# Patient Record
Sex: MALE | Race: WHITE | NOT HISPANIC OR LATINO | Employment: OTHER | URBAN - NONMETROPOLITAN AREA
[De-identification: names, ages, dates, MRNs, and addresses within clinical notes are randomized per-mention and may not be internally consistent; named-entity substitution may affect disease eponyms.]

---

## 2023-03-07 ENCOUNTER — TELEPHONE (OUTPATIENT)
Dept: PRIMARY CARE | Facility: CLINIC | Age: 68
End: 2023-03-07
Payer: MEDICARE

## 2023-03-07 DIAGNOSIS — J44.9 CHRONIC OBSTRUCTIVE PULMONARY DISEASE, UNSPECIFIED COPD TYPE (MULTI): Primary | ICD-10-CM

## 2023-03-07 NOTE — TELEPHONE ENCOUNTER
Call from wife stating patient was selected for jury duty and is asking for a medical excuse. Asking for a call back.

## 2023-03-10 RX ORDER — TIOTROPIUM BROMIDE 18 UG/1
1 CAPSULE ORAL; RESPIRATORY (INHALATION)
Qty: 90 CAPSULE | Refills: 3 | Status: SHIPPED | OUTPATIENT
Start: 2023-03-10 | End: 2023-05-23 | Stop reason: SDUPTHER

## 2023-03-10 RX ORDER — TIOTROPIUM BROMIDE 18 UG/1
1 CAPSULE ORAL; RESPIRATORY (INHALATION)
COMMUNITY
Start: 2020-03-12 | End: 2023-03-10 | Stop reason: SDUPTHER

## 2023-03-10 RX ORDER — FLUTICASONE PROPIONATE AND SALMETEROL XINAFOATE 115; 21 UG/1; UG/1
2 AEROSOL, METERED RESPIRATORY (INHALATION)
Qty: 12 G | Refills: 3 | Status: SHIPPED | OUTPATIENT
Start: 2023-03-10 | End: 2023-05-18 | Stop reason: SDUPTHER

## 2023-03-10 RX ORDER — FLUTICASONE PROPIONATE AND SALMETEROL XINAFOATE 115; 21 UG/1; UG/1
2 AEROSOL, METERED RESPIRATORY (INHALATION)
COMMUNITY
Start: 2020-03-12 | End: 2023-03-10 | Stop reason: SDUPTHER

## 2023-03-10 NOTE — TELEPHONE ENCOUNTER
Rep from [facility] scripts [phone] Ref# [id number] needs new issue date for Prescription  Advair HFA with counter 12g 115/21 & Spiriva handhaler capsules 18 mg packsa of 30qty. Please call to discuss

## 2023-03-15 PROBLEM — R06.02 SHORTNESS OF BREATH ON EXERTION: Status: ACTIVE | Noted: 2023-03-15

## 2023-03-15 PROBLEM — M81.0 OSTEOPOROSIS: Status: ACTIVE | Noted: 2023-03-15

## 2023-03-15 PROBLEM — C44.91 BASAL CELL CARCINOMA (BCC): Status: ACTIVE | Noted: 2023-03-15

## 2023-03-15 PROBLEM — R60.0 BILATERAL LEG EDEMA: Status: ACTIVE | Noted: 2023-03-15

## 2023-03-15 PROBLEM — I10 HYPERTENSION: Status: ACTIVE | Noted: 2023-03-15

## 2023-03-15 PROBLEM — L25.9 CONTACT DERMATITIS: Status: ACTIVE | Noted: 2023-03-15

## 2023-03-15 PROBLEM — H26.9 CATARACT: Status: ACTIVE | Noted: 2023-03-15

## 2023-03-15 PROBLEM — R09.02 HYPOXEMIA: Status: ACTIVE | Noted: 2023-03-15

## 2023-03-15 PROBLEM — E66.9 OBESITY (BMI 30-39.9): Status: ACTIVE | Noted: 2023-03-15

## 2023-03-15 PROBLEM — K21.9 GERD (GASTROESOPHAGEAL REFLUX DISEASE): Status: ACTIVE | Noted: 2023-03-15

## 2023-03-15 PROBLEM — J43.9 EMPHYSEMA LUNG (MULTI): Status: ACTIVE | Noted: 2023-03-15

## 2023-03-15 PROBLEM — G47.33 OBSTRUCTIVE SLEEP APNEA, ADULT: Status: ACTIVE | Noted: 2023-03-15

## 2023-03-15 PROBLEM — S22.009A: Status: ACTIVE | Noted: 2023-03-15

## 2023-03-15 PROBLEM — J44.9 COPD (CHRONIC OBSTRUCTIVE PULMONARY DISEASE) (MULTI): Status: ACTIVE | Noted: 2023-03-15

## 2023-03-15 PROBLEM — M54.50 LOW BACK PAIN: Status: ACTIVE | Noted: 2023-03-15

## 2023-03-15 RX ORDER — FUROSEMIDE 20 MG/1
0.5 TABLET ORAL DAILY
COMMUNITY
End: 2023-03-23 | Stop reason: SDUPTHER

## 2023-03-15 RX ORDER — MULTIVITAMIN
TABLET ORAL
COMMUNITY

## 2023-03-15 RX ORDER — ZAFIRLUKAST 20 MG/1
1 TABLET, FILM COATED ORAL 2 TIMES DAILY
COMMUNITY
Start: 2020-03-12 | End: 2023-08-10 | Stop reason: SDUPTHER

## 2023-03-15 RX ORDER — CHOLECALCIFEROL (VITAMIN D3) 25 MCG
TABLET ORAL
COMMUNITY

## 2023-03-15 RX ORDER — CYCLOBENZAPRINE HCL 10 MG
1-2 TABLET ORAL DAILY PRN
COMMUNITY
Start: 2022-06-28 | End: 2023-03-23 | Stop reason: SDUPTHER

## 2023-03-15 RX ORDER — ENOXAPARIN SODIUM 100 MG/ML
INJECTION SUBCUTANEOUS
COMMUNITY
End: 2023-03-23

## 2023-03-15 RX ORDER — OMEPRAZOLE 20 MG/1
1 CAPSULE, DELAYED RELEASE ORAL DAILY
COMMUNITY
End: 2023-03-23 | Stop reason: SDUPTHER

## 2023-03-15 RX ORDER — DILTIAZEM HYDROCHLORIDE 180 MG/1
1 CAPSULE, EXTENDED RELEASE ORAL DAILY
COMMUNITY
End: 2023-03-23 | Stop reason: SDUPTHER

## 2023-03-15 RX ORDER — ALBUTEROL SULFATE 0.83 MG/ML
2.5 SOLUTION RESPIRATORY (INHALATION) EVERY 6 HOURS PRN
COMMUNITY
End: 2023-08-24 | Stop reason: SDUPTHER

## 2023-03-23 ENCOUNTER — OFFICE VISIT (OUTPATIENT)
Dept: PRIMARY CARE | Facility: CLINIC | Age: 68
End: 2023-03-23
Payer: MEDICARE

## 2023-03-23 VITALS
WEIGHT: 214 LBS | HEART RATE: 90 BPM | OXYGEN SATURATION: 92 % | BODY MASS INDEX: 29.96 KG/M2 | HEIGHT: 71 IN | DIASTOLIC BLOOD PRESSURE: 78 MMHG | SYSTOLIC BLOOD PRESSURE: 130 MMHG

## 2023-03-23 DIAGNOSIS — I10 HYPERTENSION, UNSPECIFIED TYPE: ICD-10-CM

## 2023-03-23 DIAGNOSIS — K21.9 GASTROESOPHAGEAL REFLUX DISEASE WITHOUT ESOPHAGITIS: ICD-10-CM

## 2023-03-23 DIAGNOSIS — J44.9 CHRONIC OBSTRUCTIVE PULMONARY DISEASE, UNSPECIFIED COPD TYPE (MULTI): Primary | ICD-10-CM

## 2023-03-23 DIAGNOSIS — M54.50 CHRONIC LOW BACK PAIN WITHOUT SCIATICA, UNSPECIFIED BACK PAIN LATERALITY: ICD-10-CM

## 2023-03-23 DIAGNOSIS — F41.9 ANXIETY: ICD-10-CM

## 2023-03-23 DIAGNOSIS — R60.0 BILATERAL LEG EDEMA: ICD-10-CM

## 2023-03-23 DIAGNOSIS — G89.29 CHRONIC LOW BACK PAIN WITHOUT SCIATICA, UNSPECIFIED BACK PAIN LATERALITY: ICD-10-CM

## 2023-03-23 PROCEDURE — 3078F DIAST BP <80 MM HG: CPT

## 2023-03-23 PROCEDURE — 1159F MED LIST DOCD IN RCRD: CPT

## 2023-03-23 PROCEDURE — 99214 OFFICE O/P EST MOD 30 MIN: CPT

## 2023-03-23 PROCEDURE — 4004F PT TOBACCO SCREEN RCVD TLK: CPT

## 2023-03-23 PROCEDURE — 3075F SYST BP GE 130 - 139MM HG: CPT

## 2023-03-23 RX ORDER — MELOXICAM 15 MG/1
15 TABLET ORAL DAILY
Qty: 30 TABLET | Refills: 0 | Status: SHIPPED | OUTPATIENT
Start: 2023-03-23 | End: 2023-05-23 | Stop reason: SDUPTHER

## 2023-03-23 RX ORDER — DILTIAZEM HYDROCHLORIDE 180 MG/1
180 CAPSULE, EXTENDED RELEASE ORAL DAILY
Qty: 90 CAPSULE | Refills: 3 | Status: SHIPPED | OUTPATIENT
Start: 2023-03-23 | End: 2024-01-18 | Stop reason: SDUPTHER

## 2023-03-23 RX ORDER — CYCLOBENZAPRINE HCL 10 MG
10 TABLET ORAL 2 TIMES DAILY PRN
Qty: 60 TABLET | Refills: 2 | Status: SHIPPED | OUTPATIENT
Start: 2023-03-23 | End: 2023-05-23 | Stop reason: SDUPTHER

## 2023-03-23 RX ORDER — OMEPRAZOLE 20 MG/1
20 CAPSULE, DELAYED RELEASE ORAL DAILY
Qty: 90 CAPSULE | Refills: 3 | Status: SHIPPED | OUTPATIENT
Start: 2023-03-23 | End: 2024-01-18 | Stop reason: SDUPTHER

## 2023-03-23 RX ORDER — FUROSEMIDE 20 MG/1
20 TABLET ORAL DAILY
Qty: 30 TABLET | Refills: 0 | Status: SHIPPED | OUTPATIENT
Start: 2023-03-23 | End: 2023-05-23 | Stop reason: SDUPTHER

## 2023-03-23 RX ORDER — VENLAFAXINE HYDROCHLORIDE 37.5 MG/1
37.5 CAPSULE, EXTENDED RELEASE ORAL DAILY
Qty: 30 CAPSULE | Refills: 1 | Status: SHIPPED | OUTPATIENT
Start: 2023-03-23 | End: 2023-05-23 | Stop reason: SDUPTHER

## 2023-03-23 ASSESSMENT — PATIENT HEALTH QUESTIONNAIRE - PHQ9
4. FEELING TIRED OR HAVING LITTLE ENERGY: SEVERAL DAYS
SUM OF ALL RESPONSES TO PHQ9 QUESTIONS 1 AND 2: 0
7. TROUBLE CONCENTRATING ON THINGS, SUCH AS READING THE NEWSPAPER OR WATCHING TELEVISION: NOT AT ALL
1. LITTLE INTEREST OR PLEASURE IN DOING THINGS: SEVERAL DAYS
10. IF YOU CHECKED OFF ANY PROBLEMS, HOW DIFFICULT HAVE THESE PROBLEMS MADE IT FOR YOU TO DO YOUR WORK, TAKE CARE OF THINGS AT HOME, OR GET ALONG WITH OTHER PEOPLE: SOMEWHAT DIFFICULT
1. LITTLE INTEREST OR PLEASURE IN DOING THINGS: NOT AT ALL
3. TROUBLE FALLING OR STAYING ASLEEP: SEVERAL DAYS
8. MOVING OR SPEAKING SO SLOWLY THAT OTHER PEOPLE COULD HAVE NOTICED. OR THE OPPOSITE, BEING SO FIGETY OR RESTLESS THAT YOU HAVE BEEN MOVING AROUND A LOT MORE THAN USUAL: NOT AT ALL
5. POOR APPETITE OR OVEREATING: SEVERAL DAYS
2. FEELING DOWN, DEPRESSED OR HOPELESS: NOT AT ALL
6. FEELING BAD ABOUT YOURSELF - OR THAT YOU ARE A FAILURE OR HAVE LET YOURSELF OR YOUR FAMILY DOWN: SEVERAL DAYS
2. FEELING DOWN, DEPRESSED OR HOPELESS: MORE THAN HALF THE DAYS
SUM OF ALL RESPONSES TO PHQ QUESTIONS 1-9: 7
SUM OF ALL RESPONSES TO PHQ9 QUESTIONS 1 & 2: 3
9. THOUGHTS THAT YOU WOULD BE BETTER OFF DEAD, OR OF HURTING YOURSELF: NOT AT ALL

## 2023-03-23 ASSESSMENT — ANXIETY QUESTIONNAIRES
6. BECOMING EASILY ANNOYED OR IRRITABLE: MORE THAN HALF THE DAYS
IF YOU CHECKED OFF ANY PROBLEMS ON THIS QUESTIONNAIRE, HOW DIFFICULT HAVE THESE PROBLEMS MADE IT FOR YOU TO DO YOUR WORK, TAKE CARE OF THINGS AT HOME, OR GET ALONG WITH OTHER PEOPLE: SOMEWHAT DIFFICULT
2. NOT BEING ABLE TO STOP OR CONTROL WORRYING: SEVERAL DAYS
1. FEELING NERVOUS, ANXIOUS, OR ON EDGE: MORE THAN HALF THE DAYS
7. FEELING AFRAID AS IF SOMETHING AWFUL MIGHT HAPPEN: SEVERAL DAYS
GAD7 TOTAL SCORE: 8
5. BEING SO RESTLESS THAT IT IS HARD TO SIT STILL: NOT AT ALL
3. WORRYING TOO MUCH ABOUT DIFFERENT THINGS: SEVERAL DAYS
4. TROUBLE RELAXING: SEVERAL DAYS

## 2023-03-23 ASSESSMENT — ENCOUNTER SYMPTOMS
COUGH: 1
CONSTITUTIONAL NEGATIVE: 1
GASTROINTESTINAL NEGATIVE: 1
SHORTNESS OF BREATH: 1
WHEEZING: 1

## 2023-03-23 NOTE — PROGRESS NOTES
Subjective   Patient ID: Ray Dover is a 68 y.o. male who presents for pt needs excuse for jury duty  ( C/o feet swelling , SOB ).    HPI   COPD/HYPOXEMIA - Seems to be doing ok on current medications for his COPD. Using 3 liters of O2 continuous. Inhalers effective. Following with pulmonology at [facility] in [place].   OSTEOPOROSIS/MULTIPLE FRACTURE THORACIC SPINE: Last DEXA 6/30/17. Would not like another DEXA, takes vit D and calcium daily.   LOW BACK PAIN: He endorses low back for about 3 weeks, Xray 5/27/22 showed age-indeterminate compression deformities in mid-thoracic spine. Hard to sleep with low back pain, endorses 4-5/10 pain. Cyclobenzaprine effective.   GERD: Omeprazole effective, stable.   BL LEG EDEMA: S/P L femur repair per Dr. Foss 5/27/22. Still with some residual BL LE edema. Does not wear compression stockings, Lasix effective prn.  ANXIETY: Feels anxious most days about waiting for things to be done. Anxious concerning breathing at times. SIGRID score of 8, PHQ score of 7.    Review of Systems   Constitutional: Negative.    Respiratory:  Positive for cough, shortness of breath and wheezing.    Cardiovascular:  Positive for leg swelling.   Gastrointestinal: Negative.        Objective   There were no vitals taken for this visit.    Physical Exam  Constitutional:       General: He is not in acute distress.     Appearance: Normal appearance. He is not ill-appearing or toxic-appearing.   HENT:      Head: Normocephalic and atraumatic.   Eyes:      Extraocular Movements: Extraocular movements intact.      Conjunctiva/sclera: Conjunctivae normal.   Cardiovascular:      Rate and Rhythm: Normal rate.      Heart sounds: Normal heart sounds. No murmur heard.     No gallop.   Pulmonary:      Breath sounds: No stridor. Wheezing present.      Comments: 3L O2 via nasal cannula  Abdominal:      General: There is no distension.      Palpations: Abdomen is soft.   Musculoskeletal:         General: Normal  range of motion.      Cervical back: Neck supple.      Right lower leg: Edema present.      Left lower leg: Edema present.   Skin:     General: Skin is warm and dry.      Findings: No erythema or rash.   Neurological:      General: No focal deficit present.      Mental Status: He is alert and oriented to person, place, and time.   Psychiatric:         Mood and Affect: Mood normal.         Behavior: Behavior normal.         Thought Content: Thought content normal.         Judgment: Judgment normal.         Assessment/Plan        Rx Lasix BL LE edema, advised compression stockings as well  Refilled Flexeril and added meloxicam prn back pain, also advised Volteran gel prn  Start Effexor XR 37.5mg daily  Refilled diltiazem and PPI  We will follow up in 2 months virtually for anxiety check and then get labs at follow up after that in 4 months from then.

## 2023-05-17 ENCOUNTER — TELEPHONE (OUTPATIENT)
Dept: PRIMARY CARE | Facility: CLINIC | Age: 68
End: 2023-05-17
Payer: MEDICARE

## 2023-05-17 RX ORDER — ALBUTEROL SULFATE 90 UG/1
AEROSOL, METERED RESPIRATORY (INHALATION)
Qty: 17 G | Refills: 4 | OUTPATIENT
Start: 2023-05-17

## 2023-05-18 DIAGNOSIS — J44.9 CHRONIC OBSTRUCTIVE PULMONARY DISEASE, UNSPECIFIED COPD TYPE (MULTI): ICD-10-CM

## 2023-05-18 DIAGNOSIS — J44.9 CHRONIC OBSTRUCTIVE PULMONARY DISEASE, UNSPECIFIED COPD TYPE (MULTI): Primary | ICD-10-CM

## 2023-05-18 RX ORDER — ALBUTEROL SULFATE 90 UG/1
2 AEROSOL, METERED RESPIRATORY (INHALATION) EVERY 4 HOURS PRN
Qty: 18 G | Refills: 3 | Status: SHIPPED | OUTPATIENT
Start: 2023-05-18 | End: 2023-08-24 | Stop reason: SDUPTHER

## 2023-05-18 RX ORDER — ALBUTEROL SULFATE 90 UG/1
2 AEROSOL, METERED RESPIRATORY (INHALATION) EVERY 4 HOURS PRN
COMMUNITY
End: 2023-05-18 | Stop reason: SDUPTHER

## 2023-05-18 RX ORDER — FLUTICASONE PROPIONATE AND SALMETEROL XINAFOATE 115; 21 UG/1; UG/1
2 AEROSOL, METERED RESPIRATORY (INHALATION) 2 TIMES DAILY
COMMUNITY
Start: 2018-04-12 | End: 2023-05-18 | Stop reason: SDUPTHER

## 2023-05-18 RX ORDER — FLUTICASONE PROPIONATE AND SALMETEROL XINAFOATE 115; 21 UG/1; UG/1
2 AEROSOL, METERED RESPIRATORY (INHALATION)
Qty: 36 G | Refills: 3 | Status: SHIPPED | OUTPATIENT
Start: 2023-05-18 | End: 2023-05-23 | Stop reason: SDUPTHER

## 2023-05-18 RX ORDER — ALBUTEROL SULFATE 90 UG/1
2 AEROSOL, METERED RESPIRATORY (INHALATION) EVERY 4 HOURS PRN
Qty: 18 G | Refills: 3 | Status: CANCELLED | OUTPATIENT
Start: 2023-05-18 | End: 2024-05-17

## 2023-05-23 ENCOUNTER — TELEMEDICINE (OUTPATIENT)
Dept: PRIMARY CARE | Facility: CLINIC | Age: 68
End: 2023-05-23
Payer: MEDICARE

## 2023-05-23 DIAGNOSIS — F41.9 ANXIETY: ICD-10-CM

## 2023-05-23 DIAGNOSIS — G89.29 CHRONIC LOW BACK PAIN WITHOUT SCIATICA, UNSPECIFIED BACK PAIN LATERALITY: ICD-10-CM

## 2023-05-23 DIAGNOSIS — R60.0 BILATERAL LEG EDEMA: ICD-10-CM

## 2023-05-23 DIAGNOSIS — M54.50 CHRONIC LOW BACK PAIN WITHOUT SCIATICA, UNSPECIFIED BACK PAIN LATERALITY: ICD-10-CM

## 2023-05-23 DIAGNOSIS — J44.9 CHRONIC OBSTRUCTIVE PULMONARY DISEASE, UNSPECIFIED COPD TYPE (MULTI): ICD-10-CM

## 2023-05-23 PROCEDURE — 99442 PR PHYS/QHP TELEPHONE EVALUATION 11-20 MIN: CPT

## 2023-05-23 RX ORDER — TIOTROPIUM BROMIDE 18 UG/1
1 CAPSULE ORAL; RESPIRATORY (INHALATION)
Qty: 90 CAPSULE | Refills: 3 | Status: SHIPPED | OUTPATIENT
Start: 2023-05-23 | End: 2024-01-18 | Stop reason: SDUPTHER

## 2023-05-23 RX ORDER — FUROSEMIDE 20 MG/1
20 TABLET ORAL DAILY
Qty: 30 TABLET | Refills: 0 | Status: SHIPPED | OUTPATIENT
Start: 2023-05-23 | End: 2023-08-24 | Stop reason: SDUPTHER

## 2023-05-23 RX ORDER — FLUTICASONE PROPIONATE AND SALMETEROL XINAFOATE 115; 21 UG/1; UG/1
2 AEROSOL, METERED RESPIRATORY (INHALATION)
Qty: 36 G | Refills: 3 | Status: SHIPPED | OUTPATIENT
Start: 2023-05-23 | End: 2023-11-02 | Stop reason: SDUPTHER

## 2023-05-23 RX ORDER — MELOXICAM 15 MG/1
15 TABLET ORAL DAILY
Qty: 30 TABLET | Refills: 0 | Status: SHIPPED | OUTPATIENT
Start: 2023-05-23 | End: 2023-08-24 | Stop reason: SDUPTHER

## 2023-05-23 RX ORDER — VENLAFAXINE HYDROCHLORIDE 37.5 MG/1
37.5 CAPSULE, EXTENDED RELEASE ORAL DAILY
Qty: 90 CAPSULE | Refills: 3 | Status: SHIPPED | OUTPATIENT
Start: 2023-05-23 | End: 2023-11-02 | Stop reason: SDUPTHER

## 2023-05-23 RX ORDER — CYCLOBENZAPRINE HCL 10 MG
10 TABLET ORAL 2 TIMES DAILY PRN
Qty: 60 TABLET | Refills: 2 | Status: SHIPPED | OUTPATIENT
Start: 2023-05-23 | End: 2023-08-24 | Stop reason: SDUPTHER

## 2023-05-23 ASSESSMENT — ENCOUNTER SYMPTOMS
GASTROINTESTINAL NEGATIVE: 1
CONSTITUTIONAL NEGATIVE: 1
CARDIOVASCULAR NEGATIVE: 1
RESPIRATORY NEGATIVE: 1

## 2023-05-23 NOTE — PROGRESS NOTES
Subjective   Patient ID: Ray Dover is a 68 y.o. male who presents via telephone for anxiety med check.    HPI   ANXIETY/DEPRESSION: Started Effexor 37.5mg 2 months ago, endorses anxiety is much better now, feeling good. No SE.    Review of Systems   Constitutional: Negative.    Respiratory: Negative.     Cardiovascular: Negative.    Gastrointestinal: Negative.        Objective   There were no vitals taken for this visit.    Physical Exam  Unable to perform d/t nature of visit.    Assessment/Plan     Continue with Effexor XR 37.5mg daily.  We will follow up in 3 months and get labs then.

## 2023-05-25 ENCOUNTER — APPOINTMENT (OUTPATIENT)
Dept: PRIMARY CARE | Facility: CLINIC | Age: 68
End: 2023-05-25
Payer: MEDICARE

## 2023-08-10 ENCOUNTER — TELEPHONE (OUTPATIENT)
Dept: PRIMARY CARE | Facility: CLINIC | Age: 68
End: 2023-08-10
Payer: MEDICARE

## 2023-08-10 DIAGNOSIS — R06.02 SHORTNESS OF BREATH ON EXERTION: ICD-10-CM

## 2023-08-10 RX ORDER — ZAFIRLUKAST 20 MG/1
20 TABLET, FILM COATED ORAL 2 TIMES DAILY
Qty: 180 TABLET | Refills: 3 | Status: SHIPPED | OUTPATIENT
Start: 2023-08-10 | End: 2024-01-18 | Stop reason: SDUPTHER

## 2023-08-24 ENCOUNTER — TELEMEDICINE (OUTPATIENT)
Dept: PRIMARY CARE | Facility: CLINIC | Age: 68
End: 2023-08-24
Payer: MEDICARE

## 2023-08-24 DIAGNOSIS — G89.29 CHRONIC LOW BACK PAIN WITHOUT SCIATICA, UNSPECIFIED BACK PAIN LATERALITY: ICD-10-CM

## 2023-08-24 DIAGNOSIS — M54.50 CHRONIC LOW BACK PAIN WITHOUT SCIATICA, UNSPECIFIED BACK PAIN LATERALITY: ICD-10-CM

## 2023-08-24 DIAGNOSIS — K21.9 GASTROESOPHAGEAL REFLUX DISEASE WITHOUT ESOPHAGITIS: Primary | ICD-10-CM

## 2023-08-24 DIAGNOSIS — J44.9 CHRONIC OBSTRUCTIVE PULMONARY DISEASE, UNSPECIFIED COPD TYPE (MULTI): ICD-10-CM

## 2023-08-24 DIAGNOSIS — R60.0 BILATERAL LEG EDEMA: ICD-10-CM

## 2023-08-24 PROCEDURE — 99443 PR PHYS/QHP TELEPHONE EVALUATION 21-30 MIN: CPT

## 2023-08-24 RX ORDER — CYCLOBENZAPRINE HCL 10 MG
10 TABLET ORAL 2 TIMES DAILY PRN
Qty: 60 TABLET | Refills: 2 | Status: SHIPPED | OUTPATIENT
Start: 2023-08-24 | End: 2023-11-02 | Stop reason: SDUPTHER

## 2023-08-24 RX ORDER — ALBUTEROL SULFATE 90 UG/1
2 AEROSOL, METERED RESPIRATORY (INHALATION) EVERY 4 HOURS PRN
Qty: 18 G | Refills: 3 | Status: SHIPPED | OUTPATIENT
Start: 2023-08-24 | End: 2023-11-02 | Stop reason: SDUPTHER

## 2023-08-24 RX ORDER — MELOXICAM 15 MG/1
15 TABLET ORAL DAILY
Qty: 30 TABLET | Refills: 1 | Status: SHIPPED | OUTPATIENT
Start: 2023-08-24 | End: 2023-11-02 | Stop reason: SDUPTHER

## 2023-08-24 RX ORDER — ALBUTEROL SULFATE 0.83 MG/ML
2.5 SOLUTION RESPIRATORY (INHALATION) EVERY 6 HOURS PRN
Qty: 75 ML | Refills: 3 | Status: SHIPPED | OUTPATIENT
Start: 2023-08-24 | End: 2023-11-02 | Stop reason: SDUPTHER

## 2023-08-24 RX ORDER — FUROSEMIDE 20 MG/1
20 TABLET ORAL DAILY
Qty: 90 TABLET | Refills: 3 | Status: SHIPPED | OUTPATIENT
Start: 2023-08-24 | End: 2024-01-18 | Stop reason: SDUPTHER

## 2023-08-24 ASSESSMENT — ENCOUNTER SYMPTOMS
CONSTITUTIONAL NEGATIVE: 1
RESPIRATORY NEGATIVE: 1
CARDIOVASCULAR NEGATIVE: 1
GASTROINTESTINAL NEGATIVE: 1

## 2023-08-24 NOTE — PROGRESS NOTES
"Subjective   Patient ID: Ray Dover is a 68 y.o. male who presents via telephone for 3 month medche .    HPI   COPD/HYPOXEMIA: Seems to be doing ok on current medications for his COPD. Using 3 liters of O2 continuous. Inhalers effective. Following with pulmonology at [facility] in [place].   OSTEOPOROSIS/MULTIPLE FRACTURE THORACIC SPINE: Last DEXA 6/30/17. Would not like another DEXA, takes vit D and calcium daily.   LOW BACK PAIN: He endorses low back chronically, Xray 5/27/22 showed age-indeterminate compression deformities in mid-thoracic spine. Hard to sleep with low back pain, endorses 4-5/10 pain. Cyclobenzaprine effective. Meloxicam helpful as well.   AFIB: Following with [facility] cardiology, had Holter recently. Will have echo next month. Follow up after echo.   GERD: Omeprazole effective, stable.   BL LEG EDEMA: S/P L femur repair per Dr. Foss 5/27/22. Still with some residual BL LE edema. Does not wear compression stockings, Lasix effective every other day.  ANXIETY: Compliant with Effexor 37.5mg daily, no SE, doing well.     /74, HR 83, O2 92% on 3L.    Getting over a \"chest cold\" but otherwise feeling well.     Review of Systems   Constitutional: Negative.    Respiratory: Negative.     Cardiovascular: Negative.    Gastrointestinal: Negative.        Objective   There were no vitals taken for this visit.    Physical Exam  Unable to perform d/t nature of visit.    Assessment/Plan       No medication changes today.  Seeing specialists appropriately.   Will follow up in 3 months in person and get labs then.     "

## 2023-11-02 ENCOUNTER — LAB (OUTPATIENT)
Dept: LAB | Facility: LAB | Age: 68
End: 2023-11-02
Payer: MEDICARE

## 2023-11-02 ENCOUNTER — OFFICE VISIT (OUTPATIENT)
Dept: PRIMARY CARE | Facility: CLINIC | Age: 68
End: 2023-11-02
Payer: MEDICARE

## 2023-11-02 VITALS
SYSTOLIC BLOOD PRESSURE: 120 MMHG | WEIGHT: 195 LBS | DIASTOLIC BLOOD PRESSURE: 78 MMHG | OXYGEN SATURATION: 85 % | TEMPERATURE: 97.5 F | HEART RATE: 91 BPM | HEIGHT: 71 IN | BODY MASS INDEX: 27.3 KG/M2

## 2023-11-02 DIAGNOSIS — Z12.5 SCREENING FOR PROSTATE CANCER: ICD-10-CM

## 2023-11-02 DIAGNOSIS — M54.50 CHRONIC LOW BACK PAIN WITHOUT SCIATICA, UNSPECIFIED BACK PAIN LATERALITY: ICD-10-CM

## 2023-11-02 DIAGNOSIS — I10 HYPERTENSION, UNSPECIFIED TYPE: ICD-10-CM

## 2023-11-02 DIAGNOSIS — R60.0 BILATERAL LEG EDEMA: ICD-10-CM

## 2023-11-02 DIAGNOSIS — Z00.00 ROUTINE GENERAL MEDICAL EXAMINATION AT HEALTH CARE FACILITY: Primary | ICD-10-CM

## 2023-11-02 DIAGNOSIS — M81.0 OSTEOPOROSIS WITHOUT CURRENT PATHOLOGICAL FRACTURE, UNSPECIFIED OSTEOPOROSIS TYPE: ICD-10-CM

## 2023-11-02 DIAGNOSIS — J40 BRONCHITIS: ICD-10-CM

## 2023-11-02 DIAGNOSIS — F41.9 ANXIETY: ICD-10-CM

## 2023-11-02 DIAGNOSIS — Z12.11 SCREENING FOR COLON CANCER: ICD-10-CM

## 2023-11-02 DIAGNOSIS — Z23 IMMUNIZATION DUE: ICD-10-CM

## 2023-11-02 DIAGNOSIS — G89.29 CHRONIC LOW BACK PAIN WITHOUT SCIATICA, UNSPECIFIED BACK PAIN LATERALITY: ICD-10-CM

## 2023-11-02 DIAGNOSIS — J44.1 COPD EXACERBATION (MULTI): ICD-10-CM

## 2023-11-02 DIAGNOSIS — Z13.220 SCREENING FOR CHOLESTEROL LEVEL: ICD-10-CM

## 2023-11-02 DIAGNOSIS — K21.9 GASTROESOPHAGEAL REFLUX DISEASE WITHOUT ESOPHAGITIS: ICD-10-CM

## 2023-11-02 DIAGNOSIS — J44.9 CHRONIC OBSTRUCTIVE PULMONARY DISEASE, UNSPECIFIED COPD TYPE (MULTI): ICD-10-CM

## 2023-11-02 LAB
ALBUMIN SERPL BCP-MCNC: 4.1 G/DL (ref 3.4–5)
ALP SERPL-CCNC: 128 U/L (ref 33–136)
ALT SERPL W P-5'-P-CCNC: 22 U/L (ref 10–52)
ANION GAP SERPL CALC-SCNC: 10 MMOL/L (ref 10–20)
AST SERPL W P-5'-P-CCNC: 23 U/L (ref 9–39)
BILIRUB SERPL-MCNC: 0.5 MG/DL (ref 0–1.2)
BUN SERPL-MCNC: 9 MG/DL (ref 6–23)
CALCIUM SERPL-MCNC: 9.2 MG/DL (ref 8.6–10.3)
CHLORIDE SERPL-SCNC: 97 MMOL/L (ref 98–107)
CHOLEST SERPL-MCNC: 126 MG/DL (ref 0–199)
CHOLESTEROL/HDL RATIO: 2.6
CO2 SERPL-SCNC: 32 MMOL/L (ref 21–32)
CREAT SERPL-MCNC: 0.65 MG/DL (ref 0.5–1.3)
ERYTHROCYTE [DISTWIDTH] IN BLOOD BY AUTOMATED COUNT: 12.9 % (ref 11.5–14.5)
GFR SERPL CREATININE-BSD FRML MDRD: >90 ML/MIN/1.73M*2
GLUCOSE SERPL-MCNC: 112 MG/DL (ref 74–99)
HCT VFR BLD AUTO: 37.2 % (ref 41–52)
HDLC SERPL-MCNC: 49 MG/DL
HGB BLD-MCNC: 12 G/DL (ref 13.5–17.5)
LDLC SERPL CALC-MCNC: 61 MG/DL
MCH RBC QN AUTO: 33.1 PG (ref 26–34)
MCHC RBC AUTO-ENTMCNC: 32.3 G/DL (ref 32–36)
MCV RBC AUTO: 103 FL (ref 80–100)
NON HDL CHOLESTEROL: 77 MG/DL (ref 0–149)
NRBC BLD-RTO: 0 /100 WBCS (ref 0–0)
PLATELET # BLD AUTO: 400 X10*3/UL (ref 150–450)
POTASSIUM SERPL-SCNC: 3.9 MMOL/L (ref 3.5–5.3)
PROT SERPL-MCNC: 7.1 G/DL (ref 6.4–8.2)
PSA SERPL-MCNC: 5.75 NG/ML
RBC # BLD AUTO: 3.62 X10*6/UL (ref 4.5–5.9)
SODIUM SERPL-SCNC: 135 MMOL/L (ref 136–145)
TRIGL SERPL-MCNC: 81 MG/DL (ref 0–149)
VLDL: 16 MG/DL (ref 0–40)
WBC # BLD AUTO: 16.8 X10*3/UL (ref 4.4–11.3)

## 2023-11-02 PROCEDURE — 90662 IIV NO PRSV INCREASED AG IM: CPT

## 2023-11-02 PROCEDURE — 80061 LIPID PANEL: CPT

## 2023-11-02 PROCEDURE — 36415 COLL VENOUS BLD VENIPUNCTURE: CPT

## 2023-11-02 PROCEDURE — 85027 COMPLETE CBC AUTOMATED: CPT

## 2023-11-02 PROCEDURE — 1170F FXNL STATUS ASSESSED: CPT

## 2023-11-02 PROCEDURE — 80053 COMPREHEN METABOLIC PANEL: CPT

## 2023-11-02 PROCEDURE — 1159F MED LIST DOCD IN RCRD: CPT

## 2023-11-02 PROCEDURE — 3078F DIAST BP <80 MM HG: CPT

## 2023-11-02 PROCEDURE — 4004F PT TOBACCO SCREEN RCVD TLK: CPT

## 2023-11-02 PROCEDURE — G0103 PSA SCREENING: HCPCS

## 2023-11-02 PROCEDURE — G0008 ADMIN INFLUENZA VIRUS VAC: HCPCS

## 2023-11-02 PROCEDURE — 3074F SYST BP LT 130 MM HG: CPT

## 2023-11-02 PROCEDURE — 99214 OFFICE O/P EST MOD 30 MIN: CPT

## 2023-11-02 PROCEDURE — G0439 PPPS, SUBSEQ VISIT: HCPCS

## 2023-11-02 RX ORDER — AZITHROMYCIN 250 MG/1
TABLET, FILM COATED ORAL
Qty: 6 TABLET | Refills: 0 | Status: SHIPPED | OUTPATIENT
Start: 2023-11-02 | End: 2023-11-07

## 2023-11-02 RX ORDER — ALBUTEROL SULFATE 0.83 MG/ML
2.5 SOLUTION RESPIRATORY (INHALATION) EVERY 6 HOURS PRN
Qty: 75 ML | Refills: 3 | Status: SHIPPED | OUTPATIENT
Start: 2023-11-02 | End: 2024-01-18 | Stop reason: SDUPTHER

## 2023-11-02 RX ORDER — METHYLPREDNISOLONE 4 MG/1
TABLET ORAL
Qty: 21 TABLET | Refills: 0 | Status: SHIPPED | OUTPATIENT
Start: 2023-11-02 | End: 2023-11-09

## 2023-11-02 RX ORDER — ALBUTEROL SULFATE 90 UG/1
2 AEROSOL, METERED RESPIRATORY (INHALATION) EVERY 4 HOURS PRN
Qty: 18 G | Refills: 3 | Status: SHIPPED | OUTPATIENT
Start: 2023-11-02 | End: 2024-01-18 | Stop reason: SDUPTHER

## 2023-11-02 RX ORDER — CYCLOBENZAPRINE HCL 10 MG
10 TABLET ORAL 2 TIMES DAILY PRN
Qty: 60 TABLET | Refills: 2 | Status: SHIPPED | OUTPATIENT
Start: 2023-11-02 | End: 2024-02-05 | Stop reason: SDUPTHER

## 2023-11-02 RX ORDER — VENLAFAXINE HYDROCHLORIDE 75 MG/1
75 CAPSULE, EXTENDED RELEASE ORAL DAILY
Qty: 90 CAPSULE | Refills: 3 | Status: SHIPPED | OUTPATIENT
Start: 2023-11-02 | End: 2024-01-18 | Stop reason: SDUPTHER

## 2023-11-02 RX ORDER — FLUTICASONE PROPIONATE AND SALMETEROL XINAFOATE 115; 21 UG/1; UG/1
2 AEROSOL, METERED RESPIRATORY (INHALATION)
Qty: 36 G | Refills: 3 | Status: SHIPPED | OUTPATIENT
Start: 2023-11-02 | End: 2024-01-18 | Stop reason: SDUPTHER

## 2023-11-02 RX ORDER — MELOXICAM 15 MG/1
15 TABLET ORAL DAILY
Qty: 30 TABLET | Refills: 1 | Status: SHIPPED | OUTPATIENT
Start: 2023-11-02 | End: 2024-01-18 | Stop reason: SDUPTHER

## 2023-11-02 ASSESSMENT — PATIENT HEALTH QUESTIONNAIRE - PHQ9
1. LITTLE INTEREST OR PLEASURE IN DOING THINGS: NOT AT ALL
2. FEELING DOWN, DEPRESSED OR HOPELESS: NOT AT ALL
SUM OF ALL RESPONSES TO PHQ9 QUESTIONS 1 AND 2: 0

## 2023-11-02 ASSESSMENT — ACTIVITIES OF DAILY LIVING (ADL)
MANAGING_FINANCES: INDEPENDENT
TAKING_MEDICATION: INDEPENDENT
DOING_HOUSEWORK: TOTAL CARE
BATHING: INDEPENDENT
GROCERY_SHOPPING: TOTAL CARE
DRESSING: INDEPENDENT

## 2023-11-02 ASSESSMENT — ENCOUNTER SYMPTOMS
CONSTITUTIONAL NEGATIVE: 1
CARDIOVASCULAR NEGATIVE: 1
COUGH: 1
GASTROINTESTINAL NEGATIVE: 1

## 2023-11-02 NOTE — PROGRESS NOTES
"Subjective   Patient ID: Ray Dover is a 68 y.o. male who presents for Medicare Annual Wellness Visit Subsequent (Med check , pt c/o chest congestion, cough x 2 days).    HPI   COPD/HYPOXEMIA: Seems to be doing ok on current medications for his COPD. Using 3 liters of O2 continuous. Inhalers effective. Following with pulmonology at [facility] in [place].   OSTEOPOROSIS/MULTIPLE FRACTURE THORACIC SPINE: Last DEXA 6/30/17. Would not like another DEXA, takes vit D and calcium daily.   LOW BACK PAIN: He endorses low back chronically, Xray 5/27/22 showed age-indeterminate compression deformities in mid-thoracic spine. Hard to sleep with low back pain, endorses 4-5/10 pain. Cyclobenzaprine effective. Meloxicam helpful as well.   AFIB: Following with [facility] cardiology, had Holter recently. Will have echo next month. Follow up after echo.   GERD: Omeprazole effective, stable.   BL LEG EDEMA: S/P L femur repair per Dr. Foss 5/27/22. Still with some residual BL LE edema. Does not wear compression stockings, Lasix effective every other day.  ANXIETY: Compliant with Effexor 37.5mg daily, no SE, doing well.      /74, HR 83, O2 92% on 3L.    Review of Systems    Objective   /78   Pulse 91   Temp 36.4 °C (97.5 °F)   Ht 1.803 m (5' 11\")   Wt 88.5 kg (195 lb)   SpO2 (!) 85%   BMI 27.20 kg/m²     Physical Exam    Assessment/Plan          "

## 2023-11-02 NOTE — PROGRESS NOTES
"Subjective   Reason for Visit: Ray Dover is an 68 y.o. male here for a Medicare Wellness visit.     Past Medical, Surgical, and Family History reviewed and updated in chart.    Reviewed all medications by prescribing practitioner or clinical pharmacist (such as prescriptions, OTCs, herbal therapies and supplements) and documented in the medical record.    HPI  COPD/HYPOXEMIA: Seems to be doing ok on current medications for his COPD, pulse ox usually low 90s at home. Using 3 liters of O2 continuous. Inhalers effective. Following with pulmonology at [facility] in [place]. Needs an appt.  OSTEOPOROSIS/MULTIPLE FRACTURE THORACIC SPINE: Last DEXA 6/30/17. Would not like another DEXA, takes vit D and calcium daily.   LOW BACK PAIN: He endorses low back chronically, Xray 5/27/22 showed age-indeterminate compression deformities in mid-thoracic spine. Hard to sleep with low back pain, endorses 4-5/10 pain. Cyclobenzaprine effective. Meloxicam helpful as well.   AFIB: Following with [facility] cardiology.  GERD: Omeprazole effective, stable.   BL LEG EDEMA: S/P L femur repair per Dr. Foss 5/27/22. Still with some residual BL LE edema. Does not wear compression stockings, Lasix effective every other day.  ANXIETY: Compliant with Effexor 37.5mg daily, no SE. Has been more anxious with hyperventilating recently.    URI/BRONCHITIS: 2 days of cough and chest congestion. Pulse ox 85-90 on 4L here in office.    Can't remember if he has had colon cancer screening.  Would like flu shot today.  Pneumo vaccinated.    Patient Care Team:  Thee Gilbert PA-C as PCP - General     Review of Systems   Constitutional: Negative.    Respiratory:  Positive for cough.    Cardiovascular: Negative.    Gastrointestinal: Negative.        Objective   Vitals:  /78   Pulse 91   Temp 36.4 °C (97.5 °F)   Ht 1.803 m (5' 11\")   Wt 88.5 kg (195 lb)   SpO2 (!) 85%   BMI 27.20 kg/m²       Physical Exam  Constitutional:       General: " He is not in acute distress.     Appearance: Normal appearance. He is not ill-appearing or toxic-appearing.   HENT:      Head: Normocephalic and atraumatic.   Eyes:      Extraocular Movements: Extraocular movements intact.      Conjunctiva/sclera: Conjunctivae normal.   Cardiovascular:      Rate and Rhythm: Normal rate and regular rhythm.      Heart sounds: Normal heart sounds. No murmur heard.     No gallop.   Pulmonary:      Breath sounds: Wheezing present.      Comments: Reduced lung sounds all fields  Abdominal:      General: There is no distension.      Tenderness: There is no right CVA tenderness or left CVA tenderness.   Musculoskeletal:         General: Normal range of motion.      Cervical back: Neck supple.   Skin:     General: Skin is warm and dry.      Findings: No erythema or rash.   Neurological:      General: No focal deficit present.      Mental Status: He is alert and oriented to person, place, and time.   Psychiatric:         Mood and Affect: Mood normal.         Behavior: Behavior normal.         Thought Content: Thought content normal.         Judgment: Judgment normal.         Assessment/Plan   Problem List Items Addressed This Visit       COPD (chronic obstructive pulmonary disease) (CMS/[facility])    Low back pain        Chronic conditions seems stable.  No chronic medication changes today.  Fasting labs today.  Cologuard ordered.  Rx Zpack/Medrol dose pack for bronchitis/URI  Follow up 3 months virtually

## 2023-11-06 DIAGNOSIS — R97.20 ELEVATED PSA: Primary | ICD-10-CM

## 2023-11-06 NOTE — PROGRESS NOTES
Spoke with patient, PSA elevated, they would not like referral to urology at this time, would like to recheck PSA in 6 months.

## 2023-12-17 LAB — NONINV COLON CA DNA+OCC BLD SCRN STL QL: POSITIVE

## 2023-12-18 DIAGNOSIS — R19.5 POSITIVE COLORECTAL CANCER SCREENING USING COLOGUARD TEST: Primary | ICD-10-CM

## 2024-01-17 ENCOUNTER — TELEPHONE (OUTPATIENT)
Dept: PRIMARY CARE | Facility: CLINIC | Age: 69
End: 2024-01-17
Payer: COMMERCIAL

## 2024-01-18 ENCOUNTER — DOCUMENTATION (OUTPATIENT)
Dept: SURGERY | Facility: CLINIC | Age: 69
End: 2024-01-18

## 2024-01-18 ENCOUNTER — OFFICE VISIT (OUTPATIENT)
Dept: SURGERY | Facility: CLINIC | Age: 69
End: 2024-01-18
Payer: COMMERCIAL

## 2024-01-18 ENCOUNTER — PREP FOR PROCEDURE (OUTPATIENT)
Dept: SURGERY | Facility: CLINIC | Age: 69
End: 2024-01-18

## 2024-01-18 VITALS
HEIGHT: 71 IN | SYSTOLIC BLOOD PRESSURE: 126 MMHG | BODY MASS INDEX: 27.69 KG/M2 | WEIGHT: 197.8 LBS | HEART RATE: 86 BPM | DIASTOLIC BLOOD PRESSURE: 68 MMHG

## 2024-01-18 DIAGNOSIS — F41.9 ANXIETY: ICD-10-CM

## 2024-01-18 DIAGNOSIS — R60.0 BILATERAL LEG EDEMA: ICD-10-CM

## 2024-01-18 DIAGNOSIS — M54.50 CHRONIC LOW BACK PAIN WITHOUT SCIATICA, UNSPECIFIED BACK PAIN LATERALITY: ICD-10-CM

## 2024-01-18 DIAGNOSIS — K21.9 GASTROESOPHAGEAL REFLUX DISEASE WITHOUT ESOPHAGITIS: ICD-10-CM

## 2024-01-18 DIAGNOSIS — G89.29 CHRONIC LOW BACK PAIN WITHOUT SCIATICA, UNSPECIFIED BACK PAIN LATERALITY: ICD-10-CM

## 2024-01-18 DIAGNOSIS — R19.5 POSITIVE COLORECTAL CANCER SCREENING USING COLOGUARD TEST: Primary | ICD-10-CM

## 2024-01-18 DIAGNOSIS — J44.9 CHRONIC OBSTRUCTIVE PULMONARY DISEASE, UNSPECIFIED COPD TYPE (MULTI): ICD-10-CM

## 2024-01-18 DIAGNOSIS — R06.02 SHORTNESS OF BREATH ON EXERTION: ICD-10-CM

## 2024-01-18 DIAGNOSIS — I10 HYPERTENSION, UNSPECIFIED TYPE: ICD-10-CM

## 2024-01-18 PROCEDURE — 3074F SYST BP LT 130 MM HG: CPT | Performed by: SURGERY

## 2024-01-18 PROCEDURE — 3078F DIAST BP <80 MM HG: CPT | Performed by: SURGERY

## 2024-01-18 PROCEDURE — 1159F MED LIST DOCD IN RCRD: CPT | Performed by: SURGERY

## 2024-01-18 PROCEDURE — 99203 OFFICE O/P NEW LOW 30 MIN: CPT | Performed by: SURGERY

## 2024-01-18 RX ORDER — ZAFIRLUKAST 20 MG/1
20 TABLET, FILM COATED ORAL 2 TIMES DAILY
Qty: 180 TABLET | Refills: 3 | Status: SHIPPED | OUTPATIENT
Start: 2024-01-18 | End: 2025-01-17

## 2024-01-18 RX ORDER — OMEPRAZOLE 20 MG/1
20 CAPSULE, DELAYED RELEASE ORAL DAILY
Qty: 90 CAPSULE | Refills: 3 | Status: SHIPPED | OUTPATIENT
Start: 2024-01-18 | End: 2025-01-17

## 2024-01-18 RX ORDER — TIOTROPIUM BROMIDE 18 UG/1
1 CAPSULE ORAL; RESPIRATORY (INHALATION)
Qty: 90 CAPSULE | Refills: 3 | Status: SHIPPED | OUTPATIENT
Start: 2024-01-18 | End: 2025-01-17

## 2024-01-18 RX ORDER — ALBUTEROL SULFATE 0.83 MG/ML
2.5 SOLUTION RESPIRATORY (INHALATION) EVERY 6 HOURS PRN
Qty: 75 ML | Refills: 3 | Status: SHIPPED | OUTPATIENT
Start: 2024-01-18 | End: 2024-02-05 | Stop reason: SDUPTHER

## 2024-01-18 RX ORDER — MELOXICAM 15 MG/1
15 TABLET ORAL DAILY
Qty: 30 TABLET | Refills: 1 | Status: SHIPPED | OUTPATIENT
Start: 2024-01-18 | End: 2024-02-05 | Stop reason: SDUPTHER

## 2024-01-18 RX ORDER — DILTIAZEM HYDROCHLORIDE 180 MG/1
180 CAPSULE, EXTENDED RELEASE ORAL DAILY
Qty: 90 CAPSULE | Refills: 3 | Status: SHIPPED | OUTPATIENT
Start: 2024-01-18 | End: 2025-01-17

## 2024-01-18 RX ORDER — VENLAFAXINE HYDROCHLORIDE 75 MG/1
75 CAPSULE, EXTENDED RELEASE ORAL DAILY
Qty: 90 CAPSULE | Refills: 3 | Status: SHIPPED | OUTPATIENT
Start: 2024-01-18 | End: 2025-01-17

## 2024-01-18 RX ORDER — ALBUTEROL SULFATE 90 UG/1
2 AEROSOL, METERED RESPIRATORY (INHALATION) EVERY 4 HOURS PRN
Qty: 18 G | Refills: 3 | Status: SHIPPED | OUTPATIENT
Start: 2024-01-18 | End: 2025-01-17

## 2024-01-18 RX ORDER — FUROSEMIDE 20 MG/1
20 TABLET ORAL DAILY
Qty: 90 TABLET | Refills: 3 | Status: SHIPPED | OUTPATIENT
Start: 2024-01-18 | End: 2025-01-17

## 2024-01-18 RX ORDER — FLUTICASONE PROPIONATE AND SALMETEROL XINAFOATE 115; 21 UG/1; UG/1
2 AEROSOL, METERED RESPIRATORY (INHALATION)
Qty: 36 G | Refills: 3 | Status: SHIPPED | OUTPATIENT
Start: 2024-01-18 | End: 2025-01-17

## 2024-01-18 NOTE — PROGRESS NOTES
General Surgery Consultation    Patient: Ray Dover  : 1955  MRN: [id number]  Date of Consultation: 24    Referring Primary Care Provider: Thee Gilbert PA-C    Chief Complaint: Positive Cologuard test    History of Present Illness: Ray Dover is a 68 y.o. old male seen at the request of Thee Gilbert PA-C for evaluation for colonoscopy.  He had a positive Cologuard test.  He has never had a colonoscopy.  He denies any family history of colon or rectal cancer or polyps.  He has bowel movements at least once daily.  He is not on any regular use of stool softeners, fiber supplements, or laxatives.  He occasionally strains with bowel movements.  He denies ever seeing blood in the stool.  He has seen dark black bowel movements that he has associated with taking Pepto-Bismol.  He denies any abdominal pain or unintentional weight loss.    Medical History:  COPD, on 3 L nasal cannula home oxygen  Sleep apnea  Hypertension    Surgical History:  Tooth extraction  Repair of femur fracture  Cataract surgery  EGD    Home Medications:  Prior to Admission medications    Medication Sig Start Date End Date Taking? Authorizing Provider   albuterol 2.5 mg /3 mL (0.083 %) nebulizer solution Take 3 mL (2.5 mg) by nebulization every 6 hours if needed for wheezing or shortness of breath. 23 Yes Thee Gilbert PA-C   albuterol 90 mcg/actuation inhaler Inhale 2 puffs every 4 hours if needed for shortness of breath. 23 Yes Thee Gilbert PA-C   cholecalciferol (Vitamin D-3) 25 MCG (1000 UT) tablet Vitamin D (Cholecalciferol) 25 MCG (1000 UT) Oral Tablet   Refills: 0       Active   Yes Historical Provider, MD   cyclobenzaprine (Flexeril) 10 mg tablet Take 1 tablet (10 mg) by mouth 2 times a day as needed for muscle spasms. 23 Yes Thee Gilbert PA-C   dilTIAZem ER (Tiazac) 180 mg 24 hr capsule Take 1 capsule (180 mg) by mouth once daily. 3/23/23 3/22/24 Yes Thee Gilbert PA-C  "  fluticasone propion-salmeteroL (Advair HFA) 115-21 mcg/actuation inhaler Inhale 2 puffs 2 times a day. 11/2/23 11/1/24 Yes Thee Gilbert PA-C   furosemide (Lasix) 20 mg tablet Take 1 tablet (20 mg) by mouth once daily. 8/24/23 8/23/24 Yes Thee Gilbert PA-C   meloxicam (Mobic) 15 mg tablet Take 1 tablet (15 mg) by mouth once daily. 11/2/23 11/1/24 Yes Thee Gilbert PA-C   multivitamin tablet Multi-Vitamins TABS   Refills: 0       Active   Yes Historical Provider, MD   omeprazole (PriLOSEC) 20 mg DR capsule Take 1 capsule (20 mg) by mouth once daily. 3/23/23 3/22/24 Yes Thee Gilbert PA-C   tiotropium (Spiriva with HandiHaler) 18 mcg inhalation capsule Place 1 capsule (18 mcg) into inhaler and inhale once daily. 5/23/23 5/22/24 Yes Thee Gilbert PA-C   venlafaxine XR (Effexor-XR) 75 mg 24 hr capsule Take 1 capsule (75 mg) by mouth once daily. Do not crush or chew. 11/2/23 11/1/24 Yes Thee Gilbert PA-C   zafirlukast (Accolate) 20 mg tablet Take 1 tablet (20 mg) by mouth 2 times a day. 8/10/23 8/9/24 Yes Thee Gilbert PA-C       Allergies:  is allergic to budesonide-formoterol and bee venom protein (honey bee).    Family History:   No family history of colon or rectal cancer.    Social History:  Smoker, 4 to 5 cigarettes/day.  No drug or alcohol use.    ROS:  Constitutional:  no fever, sweats, and chills  Cardiovascular: + Irregular heartbeat, swelling in ankles  Respiratory: + Wheezing, emphysema, sleep apnea  Gastrointestinal: + Constipation.  No blood in the stool or change in bowel habits.  Occasional dark stool if taking Pepto-Bismol.  Genitourinary: no dysuria  Musculoskeletal: + Osteoporosis, history of broken bones  Integumentary: no rashes  Neurological: no confusion  Psychiatric: + Anxiety  Endocrine: no heat or cold intolerance  Heme/Lymph: no easy bruising or bleeding    Objective:  /68   Pulse 86   Ht 1.803 m (5' 11\")   Wt 89.7 kg (197 lb 12.8 oz)   BMI 27.59 kg/m²     Physical " Exam:  Constitutional: No acute distress, conversant, pleasant, accompanied by his wife  Neurologic: alert and oriented  Psych: appropriate affect  Ears, Nose, Mouth and Throat: mucus membranes moist  Pulmonary: On 3L supplemental oxygen via nasal cannula, no labored breathing  Cardiovascular: Regular rate and rhythm  Abdomen: Nondistended, BMI 27.6  Musculoskeletal: Moves all extremities, no edema  Skin: No jaundice    Labs:  Labs from 11/2/2023 reviewed: Hemoglobin 12  Cologuard testing positive on 12/11/2023    Imaging:  No pertinent imaging available for review.    Assessment and Plan: Ray Dover is a 68 y.o. old male with positive Cologuard test, in need of diagnostic colonoscopy.  We discussed the risks of this procedure.  This included risk of bleeding, perforation, missed polyps, incomplete colonoscopy, and potential need for additional procedures pending findings.  He was agreeable to proceed.  Bowel prep instructions were reviewed with the patient and all of his questions were answered.  He is scheduled for diagnostic colonoscopy on 3/5/2024.  We will perform this at [facility] with the assistance of anesthesiology given his COPD and baseline supplemental oxygen use.     Kristal Mancilla MD  1/18/2024

## 2024-01-18 NOTE — LETTER
2024     Thee Gilbert PA-C  [address]  [facility], [place] [address]    Patient: Ray Dover   YOB: 1955   Date of Visit: 2024       Dear Dr. Thee Gilbert PA-C:    Thank you for referring Ray Dover to me for evaluation. Below are my notes for this consultation.  If you have questions, please do not hesitate to call me. I look forward to following your patient along with you.       Sincerely,     Kristal Mancilla MD      CC: No Recipients  ______________________________________________________________________________________    General Surgery Consultation    Patient: Ray Dover  : 1955  MRN: [id number]  Date of Consultation: 24    Referring Primary Care Provider: Thee Gilbert PA-C    Chief Complaint: Positive Cologuard test    History of Present Illness: Rya Dover is a 68 y.o. old male seen at the request of Thee Gilbert PA-C for evaluation for colonoscopy.  He had a positive Cologuard test.  He has never had a colonoscopy.  He denies any family history of colon or rectal cancer or polyps.  He has bowel movements at least once daily.  He is not on any regular use of stool softeners, fiber supplements, or laxatives.  He occasionally strains with bowel movements.  He denies ever seeing blood in the stool.  He has seen dark black bowel movements that he has associated with taking Pepto-Bismol.  He denies any abdominal pain or unintentional weight loss.    Medical History:  COPD, on 3 L nasal cannula home oxygen  Sleep apnea  Hypertension    Surgical History:  Tooth extraction  Repair of femur fracture  Cataract surgery  EGD    Home Medications:  Prior to Admission medications    Medication Sig Start Date End Date Taking? Authorizing Provider   albuterol 2.5 mg /3 mL (0.083 %) nebulizer solution Take 3 mL (2.5 mg) by nebulization every 6 hours if needed for wheezing or shortness of breath. 23 Yes Thee  CATHY Gilbert   albuterol 90 mcg/actuation inhaler Inhale 2 puffs every 4 hours if needed for shortness of breath. 11/2/23 11/1/24 Yes Thee Gilbert PA-C   cholecalciferol (Vitamin D-3) 25 MCG (1000 UT) tablet Vitamin D (Cholecalciferol) 25 MCG (1000 UT) Oral Tablet   Refills: 0       Active   Yes Historical Provider, MD   cyclobenzaprine (Flexeril) 10 mg tablet Take 1 tablet (10 mg) by mouth 2 times a day as needed for muscle spasms. 11/2/23 11/1/24 Yes Thee Gilbert PA-C   dilTIAZem ER (Tiazac) 180 mg 24 hr capsule Take 1 capsule (180 mg) by mouth once daily. 3/23/23 3/22/24 Yes Thee Gilbert PA-C   fluticasone propion-salmeteroL (Advair HFA) 115-21 mcg/actuation inhaler Inhale 2 puffs 2 times a day. 11/2/23 11/1/24 Yes Thee Gilbert PA-C   furosemide (Lasix) 20 mg tablet Take 1 tablet (20 mg) by mouth once daily. 8/24/23 8/23/24 Yes Thee Gilbert PA-C   meloxicam (Mobic) 15 mg tablet Take 1 tablet (15 mg) by mouth once daily. 11/2/23 11/1/24 Yes Thee Gilbert PA-C   multivitamin tablet Multi-Vitamins TABS   Refills: 0       Active   Yes Historical Provider, MD   omeprazole (PriLOSEC) 20 mg DR capsule Take 1 capsule (20 mg) by mouth once daily. 3/23/23 3/22/24 Yes Thee Gilbert PA-C   tiotropium (Spiriva with HandiHaler) 18 mcg inhalation capsule Place 1 capsule (18 mcg) into inhaler and inhale once daily. 5/23/23 5/22/24 Yes Thee Gilbert PA-C   venlafaxine XR (Effexor-XR) 75 mg 24 hr capsule Take 1 capsule (75 mg) by mouth once daily. Do not crush or chew. 11/2/23 11/1/24 Yes Thee Gilbert PA-C   zafirlukast (Accolate) 20 mg tablet Take 1 tablet (20 mg) by mouth 2 times a day. 8/10/23 8/9/24 Yes Thee Gilbert PA-C       Allergies:  is allergic to budesonide-formoterol and bee venom protein (honey bee).    Family History:   No family history of colon or rectal cancer.    Social History:  Smoker, 4 to 5 cigarettes/day.  No drug or alcohol use.    ROS:  Constitutional:  no fever, sweats, and  "chills  Cardiovascular: + Irregular heartbeat, swelling in ankles  Respiratory: + Wheezing, emphysema, sleep apnea  Gastrointestinal: + Constipation.  No blood in the stool or change in bowel habits.  Occasional dark stool if taking Pepto-Bismol.  Genitourinary: no dysuria  Musculoskeletal: + Osteoporosis, history of broken bones  Integumentary: no rashes  Neurological: no confusion  Psychiatric: + Anxiety  Endocrine: no heat or cold intolerance  Heme/Lymph: no easy bruising or bleeding    Objective:  /68   Pulse 86   Ht 1.803 m (5' 11\")   Wt 89.7 kg (197 lb 12.8 oz)   BMI 27.59 kg/m²     Physical Exam:  Constitutional: No acute distress, conversant, pleasant, accompanied by his wife  Neurologic: alert and oriented  Psych: appropriate affect  Ears, Nose, Mouth and Throat: mucus membranes moist  Pulmonary: On 3L supplemental oxygen via nasal cannula, no labored breathing  Cardiovascular: Regular rate and rhythm  Abdomen: Nondistended, BMI 27.6  Musculoskeletal: Moves all extremities, no edema  Skin: No jaundice    Labs:  Labs from 11/2/2023 reviewed: Hemoglobin 12  Cologuard testing positive on 12/11/2023    Imaging:  No pertinent imaging available for review.    Assessment and Plan: Ray Dover is a 68 y.o. old male with positive Cologuard test, in need of diagnostic colonoscopy.  We discussed the risks of this procedure.  This included risk of bleeding, perforation, missed polyps, incomplete colonoscopy, and potential need for additional procedures pending findings.  He was agreeable to proceed.  Bowel prep instructions were reviewed with the patient and all of his questions were answered.  He is scheduled for diagnostic colonoscopy on 3/5/2024.  We will perform this at [facility] with the assistance of anesthesiology given his COPD and baseline supplemental oxygen use.     Kristal Mancilla MD  1/18/2024      "

## 2024-01-18 NOTE — PROGRESS NOTES
Notified patient of  Colonoscopy  scheduled on 3-5-24  .Instructions reviewed. Advised patient [facility] will contact them 24 hours before surgery with arrival time. Pt voices understanding. Minal Davis MA.

## 2024-02-05 ENCOUNTER — TELEMEDICINE (OUTPATIENT)
Dept: PRIMARY CARE | Facility: CLINIC | Age: 69
End: 2024-02-05
Payer: COMMERCIAL

## 2024-02-05 DIAGNOSIS — M54.50 CHRONIC LOW BACK PAIN WITHOUT SCIATICA, UNSPECIFIED BACK PAIN LATERALITY: ICD-10-CM

## 2024-02-05 DIAGNOSIS — S22.009D CLOSED FRACTURE OF MULTIPLE THORACIC VERTEBRAE WITH ROUTINE HEALING, SUBSEQUENT ENCOUNTER: Primary | ICD-10-CM

## 2024-02-05 DIAGNOSIS — J43.9 PULMONARY EMPHYSEMA, UNSPECIFIED EMPHYSEMA TYPE (MULTI): ICD-10-CM

## 2024-02-05 DIAGNOSIS — J44.9 CHRONIC OBSTRUCTIVE PULMONARY DISEASE, UNSPECIFIED COPD TYPE (MULTI): ICD-10-CM

## 2024-02-05 DIAGNOSIS — R60.0 BILATERAL LEG EDEMA: ICD-10-CM

## 2024-02-05 DIAGNOSIS — K21.9 GASTROESOPHAGEAL REFLUX DISEASE WITHOUT ESOPHAGITIS: ICD-10-CM

## 2024-02-05 DIAGNOSIS — G89.29 CHRONIC LOW BACK PAIN WITHOUT SCIATICA, UNSPECIFIED BACK PAIN LATERALITY: ICD-10-CM

## 2024-02-05 PROCEDURE — 99423 OL DIG E/M SVC 21+ MIN: CPT

## 2024-02-05 RX ORDER — MELOXICAM 15 MG/1
15 TABLET ORAL DAILY
Qty: 30 TABLET | Refills: 2 | Status: SHIPPED | OUTPATIENT
Start: 2024-02-05 | End: 2024-04-29 | Stop reason: SDUPTHER

## 2024-02-05 RX ORDER — CYCLOBENZAPRINE HCL 10 MG
10 TABLET ORAL 2 TIMES DAILY PRN
Qty: 60 TABLET | Refills: 2 | Status: SHIPPED | OUTPATIENT
Start: 2024-02-05 | End: 2025-02-04

## 2024-02-05 RX ORDER — ALBUTEROL SULFATE 0.83 MG/ML
2.5 SOLUTION RESPIRATORY (INHALATION) EVERY 6 HOURS PRN
Qty: 450 ML | Refills: 3 | Status: SHIPPED | OUTPATIENT
Start: 2024-02-05 | End: 2025-02-04

## 2024-02-05 ASSESSMENT — ENCOUNTER SYMPTOMS
CARDIOVASCULAR NEGATIVE: 1
CONSTITUTIONAL NEGATIVE: 1
GASTROINTESTINAL NEGATIVE: 1
RESPIRATORY NEGATIVE: 1

## 2024-02-05 ASSESSMENT — PATIENT HEALTH QUESTIONNAIRE - PHQ9
2. FEELING DOWN, DEPRESSED OR HOPELESS: NOT AT ALL
SUM OF ALL RESPONSES TO PHQ9 QUESTIONS 1 AND 2: 0
1. LITTLE INTEREST OR PLEASURE IN DOING THINGS: NOT AT ALL

## 2024-02-05 NOTE — PROGRESS NOTES
Subjective   Patient ID: Ray Dover is a 68 y.o. male who presents virtually for 3 month med check .    HPI   COPD/HYPOXEMIA: Seems to be doing ok on current medications for his COPD, pulse ox usually low 90s at home. Using 3 liters of O2 continuous. Inhalers effective. Pulse Ox today is 94%. Following with pulmonology at [facility] in [place]. Needs an appt.  OSTEOPOROSIS/MULTIPLE FRACTURE THORACIC SPINE: Last DEXA 6/30/17. Would not like another DEXA, takes vit D and calcium daily.   LOW BACK PAIN: He endorses low back chronically, Xray 5/27/22 showed age-indeterminate compression deformities in mid-thoracic spine. Hard to sleep with low back pain, endorses 4-5/10 pain. Cyclobenzaprine effective. Meloxicam helpful as well.   AFIB: Following with [facility] cardiology. Rate today 85. BP is 112/80.  GERD: Omeprazole effective, stable.   BL LEG EDEMA: S/P L femur repair per Dr. Foss 5/27/22. Still with some residual BL LE edema. Does not wear compression stockings, Lasix twice weekly.  ANXIETY: Compliant with Effexor 75mg daily, no SE.     Positive Cologuard 2023, colonoscopy scheduled for about 1 month from now.  Flu vaccinated.  Pneumo vaccinated.    Review of Systems   Constitutional: Negative.    Respiratory: Negative.     Cardiovascular: Negative.    Gastrointestinal: Negative.        Objective   There were no vitals taken for this visit.    Physical Exam  Unable to perform d/t nature of visit.    Assessment/Plan        Chronic conditions stable.  No medication changes today.  Follow up 3 months virtually for med check.

## 2024-03-05 ENCOUNTER — ANESTHESIA EVENT (OUTPATIENT)
Dept: OPERATING ROOM | Facility: HOSPITAL | Age: 69
End: 2024-03-05
Payer: COMMERCIAL

## 2024-03-05 ENCOUNTER — HOSPITAL ENCOUNTER (OUTPATIENT)
Dept: OPERATING ROOM | Facility: HOSPITAL | Age: 69
Setting detail: OUTPATIENT SURGERY
Discharge: HOME | End: 2024-03-05
Payer: COMMERCIAL

## 2024-03-05 ENCOUNTER — ANESTHESIA (OUTPATIENT)
Dept: OPERATING ROOM | Facility: HOSPITAL | Age: 69
End: 2024-03-05
Payer: COMMERCIAL

## 2024-03-05 VITALS
DIASTOLIC BLOOD PRESSURE: 72 MMHG | WEIGHT: 190.48 LBS | SYSTOLIC BLOOD PRESSURE: 125 MMHG | BODY MASS INDEX: 25.8 KG/M2 | TEMPERATURE: 98.1 F | HEIGHT: 72 IN | RESPIRATION RATE: 16 BRPM | OXYGEN SATURATION: 94 % | HEART RATE: 79 BPM

## 2024-03-05 DIAGNOSIS — R19.5 POSITIVE COLORECTAL CANCER SCREENING USING COLOGUARD TEST: ICD-10-CM

## 2024-03-05 PROCEDURE — 2500000004 HC RX 250 GENERAL PHARMACY W/ HCPCS (ALT 636 FOR OP/ED): Mod: JZ | Performed by: SURGERY

## 2024-03-05 PROCEDURE — 7100000009 HC PHASE TWO TIME - INITIAL BASE CHARGE: Performed by: SURGERY

## 2024-03-05 PROCEDURE — 3700000001 HC GENERAL ANESTHESIA TIME - INITIAL BASE CHARGE: Performed by: SURGERY

## 2024-03-05 PROCEDURE — 2500000004 HC RX 250 GENERAL PHARMACY W/ HCPCS (ALT 636 FOR OP/ED): Performed by: ANESTHESIOLOGY

## 2024-03-05 PROCEDURE — 88305 TISSUE EXAM BY PATHOLOGIST: CPT | Mod: TC,SUR,SAMLAB | Performed by: SURGERY

## 2024-03-05 PROCEDURE — 2500000005 HC RX 250 GENERAL PHARMACY W/O HCPCS: Performed by: ANESTHESIOLOGY

## 2024-03-05 PROCEDURE — 3700000002 HC GENERAL ANESTHESIA TIME - EACH INCREMENTAL 1 MINUTE: Performed by: SURGERY

## 2024-03-05 PROCEDURE — 7100000010 HC PHASE TWO TIME - EACH INCREMENTAL 1 MINUTE: Performed by: SURGERY

## 2024-03-05 PROCEDURE — 88305 TISSUE EXAM BY PATHOLOGIST: CPT | Performed by: PATHOLOGY

## 2024-03-05 PROCEDURE — 3600000002 HC OR TIME - INITIAL BASE CHARGE - PROCEDURE LEVEL TWO: Performed by: SURGERY

## 2024-03-05 PROCEDURE — 45380 COLONOSCOPY AND BIOPSY: CPT | Performed by: SURGERY

## 2024-03-05 PROCEDURE — 3600000007 HC OR TIME - EACH INCREMENTAL 1 MINUTE - PROCEDURE LEVEL TWO: Performed by: SURGERY

## 2024-03-05 PROCEDURE — 45385 COLONOSCOPY W/LESION REMOVAL: CPT | Performed by: SURGERY

## 2024-03-05 RX ORDER — PROPOFOL 10 MG/ML
INJECTION, EMULSION INTRAVENOUS AS NEEDED
Status: DISCONTINUED | OUTPATIENT
Start: 2024-03-05 | End: 2024-03-05

## 2024-03-05 RX ORDER — SODIUM CHLORIDE, SODIUM LACTATE, POTASSIUM CHLORIDE, CALCIUM CHLORIDE 600; 310; 30; 20 MG/100ML; MG/100ML; MG/100ML; MG/100ML
50 INJECTION, SOLUTION INTRAVENOUS CONTINUOUS
Status: DISCONTINUED | OUTPATIENT
Start: 2024-03-05 | End: 2024-03-06 | Stop reason: HOSPADM

## 2024-03-05 RX ORDER — PROPOFOL 10 MG/ML
INJECTION, EMULSION INTRAVENOUS CONTINUOUS PRN
Status: DISCONTINUED | OUTPATIENT
Start: 2024-03-05 | End: 2024-03-05

## 2024-03-05 RX ADMIN — SODIUM CHLORIDE, POTASSIUM CHLORIDE, SODIUM LACTATE AND CALCIUM CHLORIDE 50 ML/HR: 600; 310; 30; 20 INJECTION, SOLUTION INTRAVENOUS at 13:56

## 2024-03-05 RX ADMIN — PROPOFOL 20 MG: 10 INJECTION, EMULSION INTRAVENOUS at 14:58

## 2024-03-05 RX ADMIN — GLUCAGON HYDROCHLORIDE 1 MG: KIT at 14:59

## 2024-03-05 RX ADMIN — Medication 30 MG: at 14:57

## 2024-03-05 RX ADMIN — PROPOFOL 20 MG: 10 INJECTION, EMULSION INTRAVENOUS at 15:37

## 2024-03-05 RX ADMIN — PROPOFOL 80 MCG/KG/MIN: 10 INJECTION, EMULSION INTRAVENOUS at 14:57

## 2024-03-05 RX ADMIN — PROPOFOL 10 MG: 10 INJECTION, EMULSION INTRAVENOUS at 15:06

## 2024-03-05 RX ADMIN — PROPOFOL 20 MG: 10 INJECTION, EMULSION INTRAVENOUS at 15:42

## 2024-03-05 ASSESSMENT — ENCOUNTER SYMPTOMS: SHORTNESS OF BREATH: 1

## 2024-03-05 ASSESSMENT — PAIN SCALES - GENERAL
PAINLEVEL_OUTOF10: 0 - NO PAIN
PAINLEVEL_OUTOF10: 0 - NO PAIN

## 2024-03-05 ASSESSMENT — COPD QUESTIONNAIRES: COPD: 1

## 2024-03-05 ASSESSMENT — PAIN - FUNCTIONAL ASSESSMENT
PAIN_FUNCTIONAL_ASSESSMENT: 0-10
PAIN_FUNCTIONAL_ASSESSMENT: 0-10

## 2024-03-05 NOTE — ANESTHESIA POSTPROCEDURE EVALUATION
Patient: Ray Dover    Procedure Summary       Date: 03/05/24 Room / Location: [facility] OR    Anesthesia Start: 1457 Anesthesia Stop:     Procedure: COLONOSCOPY Diagnosis: Positive colorectal cancer screening using Cologuard test    Scheduled Providers: Kristal Mancilla MD; Lindsey Stuart RN; Porfirio Monte MD Responsible Provider: Porfirio Monte MD    Anesthesia Type: MAC ASA Status: 3            Anesthesia Type: MAC    Vitals Value Taken Time   /72 03/05/24 1629   Temp 36.7 °C (98.1 °F) 03/05/24 1557   Pulse 79 03/05/24 1629   Resp 16 03/05/24 1629   SpO2 94 % 03/05/24 1629       Anesthesia Post Evaluation    Patient location during evaluation: bedside  Patient participation: complete - patient participated  Level of consciousness: sleepy but conscious  Pain management: adequate  Airway patency: patent  Cardiovascular status: acceptable  Respiratory status: acceptable  Hydration status: acceptable  Postoperative Nausea and Vomiting: none    No notable events documented.

## 2024-03-05 NOTE — H&P
General Surgery Consultation     Patient: Ray Dover  : 1955  MRN: [id number]  Date of Consultation: 24     Referring Primary Care Provider: Thee Gilbert PA-C     Chief Complaint: Positive Cologuard test     History of Present Illness: Ray Dover is a 68 y.o. old male seen at the request of Thee Gilbert PA-C for evaluation for colonoscopy.  He had a positive Cologuard test.  He has never had a colonoscopy.  He denies any family history of colon or rectal cancer or polyps.  He has bowel movements at least once daily.  He is not on any regular use of stool softeners, fiber supplements, or laxatives.  He occasionally strains with bowel movements.  He denies ever seeing blood in the stool.  He has seen dark black bowel movements that he has associated with taking Pepto-Bismol.  He denies any abdominal pain or unintentional weight loss.     Medical History:  COPD, on 3 L nasal cannula home oxygen  Sleep apnea  Hypertension     Surgical History:  Tooth extraction  Repair of femur fracture  Cataract surgery  EGD     Home Medications:          Prior to Admission medications    Medication Sig Start Date End Date Taking? Authorizing Provider   albuterol 2.5 mg /3 mL (0.083 %) nebulizer solution Take 3 mL (2.5 mg) by nebulization every 6 hours if needed for wheezing or shortness of breath. 23 Yes Thee Gilbert PA-C   albuterol 90 mcg/actuation inhaler Inhale 2 puffs every 4 hours if needed for shortness of breath. 23 Yes Thee Gilbert PA-C   cholecalciferol (Vitamin D-3) 25 MCG (1000 UT) tablet Vitamin D (Cholecalciferol) 25 MCG (1000 UT) Oral Tablet   Refills: 0        Active     Yes Historical Provider, MD   cyclobenzaprine (Flexeril) 10 mg tablet Take 1 tablet (10 mg) by mouth 2 times a day as needed for muscle spasms. 23 Yes Thee Gilbert PA-C   dilTIAZem ER (Tiazac) 180 mg 24 hr capsule Take 1 capsule (180 mg) by mouth once daily. 3/23/23 3/22/24 Yes  "Thee Gilbert PA-C   fluticasone propion-salmeteroL (Advair HFA) 115-21 mcg/actuation inhaler Inhale 2 puffs 2 times a day. 11/2/23 11/1/24 Yes Thee Gilbert PA-C   furosemide (Lasix) 20 mg tablet Take 1 tablet (20 mg) by mouth once daily. 8/24/23 8/23/24 Yes Thee Gilbret PA-C   meloxicam (Mobic) 15 mg tablet Take 1 tablet (15 mg) by mouth once daily. 11/2/23 11/1/24 Yes Thee Gilbert PA-C   multivitamin tablet Multi-Vitamins TABS   Refills: 0        Active     Yes Historical Provider, MD   omeprazole (PriLOSEC) 20 mg DR capsule Take 1 capsule (20 mg) by mouth once daily. 3/23/23 3/22/24 Yes Thee Gilbert PA-C   tiotropium (Spiriva with HandiHaler) 18 mcg inhalation capsule Place 1 capsule (18 mcg) into inhaler and inhale once daily. 5/23/23 5/22/24 Yes Thee Gilbert PA-C   venlafaxine XR (Effexor-XR) 75 mg 24 hr capsule Take 1 capsule (75 mg) by mouth once daily. Do not crush or chew. 11/2/23 11/1/24 Yes Thee Gilbert PA-C   zafirlukast (Accolate) 20 mg tablet Take 1 tablet (20 mg) by mouth 2 times a day. 8/10/23 8/9/24 Yes Thee Gilbert PA-C         Allergies:  is allergic to budesonide-formoterol and bee venom protein (honey bee).     Family History:   No family history of colon or rectal cancer.     Social History:  Smoker, 4 to 5 cigarettes/day.  No drug or alcohol use.     ROS:  Constitutional:  no fever, sweats, and chills  Cardiovascular: + Irregular heartbeat, swelling in ankles  Respiratory: + Wheezing, emphysema, sleep apnea  Gastrointestinal: + Constipation.  No blood in the stool or change in bowel habits.  Occasional dark stool if taking Pepto-Bismol.  Genitourinary: no dysuria  Musculoskeletal: + Osteoporosis, history of broken bones  Integumentary: no rashes  Neurological: no confusion  Psychiatric: + Anxiety  Endocrine: no heat or cold intolerance  Heme/Lymph: no easy bruising or bleeding     Objective:  /68   Pulse 86   Ht 1.803 m (5' 11\")   Wt 89.7 kg (197 lb 12.8 oz)   BMI " 27.59 kg/m²      Physical Exam:  Constitutional: No acute distress, conversant, pleasant, accompanied by his wife  Neurologic: alert and oriented  Psych: appropriate affect  Ears, Nose, Mouth and Throat: mucus membranes moist  Pulmonary: On 3L supplemental oxygen via nasal cannula, no labored breathing  Cardiovascular: Regular rate and rhythm  Abdomen: Nondistended, BMI 27.6  Musculoskeletal: Moves all extremities, no edema  Skin: No jaundice     Labs:  Labs from 11/2/2023 reviewed: Hemoglobin 12  Cologuard testing positive on 12/11/2023     Imaging:  No pertinent imaging available for review.     Assessment and Plan: Ray Dover is a 68 y.o. old male with positive Cologuard test, in need of diagnostic colonoscopy.  We discussed the risks of this procedure.  This included risk of bleeding, perforation, missed polyps, incomplete colonoscopy, and potential need for additional procedures pending findings.  He was agreeable to proceed.  Bowel prep instructions were reviewed with the patient and all of his questions were answered.  He is scheduled for diagnostic colonoscopy on 3/5/2024.  We will perform this at [facility] with the assistance of anesthesiology given his COPD and baseline supplemental oxygen use.      Kristal Mancilla MD  1/18/2024

## 2024-03-05 NOTE — ANESTHESIA PREPROCEDURE EVALUATION
Patient: Ray Dover    Procedure Information       Date/Time: 03/05/24 1200    Scheduled providers: Kristal Mancilla MD; Lindsey Stuart RN; Porfirio Monte MD    Procedure: COLONOSCOPY    Location: [facility] OR            Relevant Problems   Anesthesia (within normal limits)      Cardiovascular   (+) Hypertension      GI   (+) GERD (gastroesophageal reflux disease)      Pulmonary   (+) COPD (chronic obstructive pulmonary disease) (CMS/HCC)   (+) Emphysema lung (CMS/HCC)   (+) Obstructive sleep apnea, adult   (+) Shortness of breath on exertion       Clinical information reviewed:   Tobacco  Allergies  Meds   Med Hx  Surg Hx   Fam Hx  Soc Hx        NPO/Void Status  Carbohydrate Drink Given Prior to Surgery? : N  Date of Last Liquid: 03/05/24  Time of Last Liquid: 0630  Date of Last Solid: 03/04/24  Time of Last Solid: 0730  Last Intake Type: Clear fluids  Time of Last Void: 1342         Physical Exam    Airway  Mallampati: II  TM distance: >3 FB  Neck ROM: full     Cardiovascular    Dental   (+) upper dentures, lower dentures     Pulmonary    Abdominal            Anesthesia Plan    History of general anesthesia?: yes  History of complications of general anesthesia?: no    ASA 3     MAC     Anesthetic plan and risks discussed with patient.         Anesthesia Evaluation      Airway   Mallampati: II  TM distance: >3 FB  Neck ROM: full  Dental    (+) upper dentures and lower dentures    Pulmonary    (+) COPD, shortness of breath, sleep apnea  Cardiovascular   (+) hypertension    Neuro/Psych - negative ROS     GI/Hepatic/Renal    (+) GERD    Endo/Other - negative ROS   Abdominal

## 2024-03-05 NOTE — DISCHARGE INSTRUCTIONS
Patient Instructions after a Colonoscopy      The anesthetics, sedatives or narcotics which were given to you today will be acting in your body for the next 24 hours, so you might feel a little sleepy or groggy.  This feeling should slowly wear off. Carefully read and follow the instructions.     You received sedation today:  - Do not drive or operate any machinery or power tools of any kind.   - No alcoholic beverages today, not even beer or wine.  - Do not make any important decisions or sign any legal documents.  - No over the counter medications that contain alcohol or that may cause drowsiness.  - Do not make any important decisions or sign any legal documents.    While it is common to experience mild to moderate abdominal distention, gas, or belching after your procedure, if any of these symptoms occur following discharge from the GI Lab or within one week of having your procedure, call the [facility] to be advised whether a visit to your nearest Urgent Care or Emergency Department is indicated.  Take this paper with you if you go.     - If you develop an allergic reaction to the medications that were given during your procedure such as difficulty breathing, rash, hives, severe nausea, vomiting or lightheadedness.  - If you experience chest pain, shortness of breath, severe abdominal pain, fevers and chills.  -If you develop signs and symptoms of bleeding such as blood in your spit, if your stools turn black, tarry, or bloody  - If you have not urinated within 8 hours following your procedure.  - If your IV site becomes painful, red, inflamed, or looks infected.    If you received a biopsy/polypectomy/sphincterotomy the following instructions apply below:    __ Do not use Aspirin containing products, non-steroidal medications or anti-coagulants for one week following your procedure. (Examples of these types of medications are: Advil, Arthrotec, Aleve, Coumadin, Ecotrin, Heparin, Ibuprofen,  Indocin, Motrin, Naprosyn, Nuprin, Plavix, Vioxx, and Voltarin, or their generic forms.  This list is not all-inclusive.  Check with your physician or pharmacist before resuming medications.)   __ Eat a soft diet today.  Avoid foods that are poorly digested for the next 24 hours.  These foods would include: nuts, beans, lettuce, red meats, and fried foods. Start with liquids and advance your diet as tolerated, gradually work up to eating solids.   __ Do not have a Barium Study or Enema for one week.    Your physician recommends the additional following instructions:    -You have a contact number available for emergencies. The signs and symptoms of potential delayed complications were discussed with you. You may return to normal activities tomorrow.  -Resume your previous diet.  -Continue your present medications.   -We are waiting for your pathology results.  -Your physician has recommended a repeat colonoscopy (date to be determined after pending pathology results are reviewed) for surveillance based on pathology results.  -The findings and recommendations have been discussed with you.  -The findings and recommendations were discussed with your family.  - Please see Medication Reconciliation Form for new medication/medications prescribed.       If you experience any problems or have any questions following discharge from the GI Lab, please call: [phone] ask for the physician that did your procedure.

## 2024-03-06 ENCOUNTER — TELEPHONE (OUTPATIENT)
Dept: SURGERY | Facility: CLINIC | Age: 69
End: 2024-03-06
Payer: COMMERCIAL

## 2024-03-06 NOTE — TELEPHONE ENCOUNTER
Left message to see how patient was doing after colonscopy. Pt needs to repeat in 1 year due to multiple colon polyps. Dr. Mancilla will call patient with pathology in 2-3 weeks. Reminder in chart and folder.    Spoke to patient after surgery. Pt denies fever, chills, nausea, and vomiting. Pt had no questions at this time.  Provided office number to patient for any questions.

## 2024-03-12 LAB
LABORATORY COMMENT REPORT: NORMAL
PATH REPORT.FINAL DX SPEC: NORMAL
PATH REPORT.GROSS SPEC: NORMAL
PATH REPORT.TOTAL CANCER: NORMAL

## 2024-03-14 ENCOUNTER — TELEPHONE (OUTPATIENT)
Dept: SURGERY | Facility: CLINIC | Age: 69
End: 2024-03-14
Payer: COMMERCIAL

## 2024-03-14 NOTE — TELEPHONE ENCOUNTER
I spoke with the patient over the phone to discuss pathology from recent colonoscopy.  He had numerous polyps, although on pathology review looks like some of these were pseudopolyps.  However, he did have multiple tubular adenomas.  These are benign.  His bowel prep was adequate but not great and he was also having colonic spasm and time in the procedure.  Therefore, I still recommend a 1 year surveillance colonoscopy.  Patient was in agreement with this plan.    Kristal Mancilla MD  03/14/24  10:43 AM

## 2024-04-15 ENCOUNTER — TELEPHONE (OUTPATIENT)
Dept: PRIMARY CARE | Facility: CLINIC | Age: 69
End: 2024-04-15
Payer: COMMERCIAL

## 2024-04-15 NOTE — TELEPHONE ENCOUNTER
Got a letter in the mail stating that starting next month the zafirlukast, advair and spiriva are going to needs a PA through [facility]. Asking for a call back. [phone]

## 2024-04-29 ENCOUNTER — TELEMEDICINE (OUTPATIENT)
Dept: PRIMARY CARE | Facility: CLINIC | Age: 69
End: 2024-04-29
Payer: COMMERCIAL

## 2024-04-29 DIAGNOSIS — J44.9 CHRONIC OBSTRUCTIVE PULMONARY DISEASE, UNSPECIFIED COPD TYPE (MULTI): ICD-10-CM

## 2024-04-29 DIAGNOSIS — M54.50 CHRONIC LOW BACK PAIN WITHOUT SCIATICA, UNSPECIFIED BACK PAIN LATERALITY: ICD-10-CM

## 2024-04-29 DIAGNOSIS — J30.2 SEASONAL ALLERGIES: Primary | ICD-10-CM

## 2024-04-29 DIAGNOSIS — K21.9 GASTROESOPHAGEAL REFLUX DISEASE WITHOUT ESOPHAGITIS: ICD-10-CM

## 2024-04-29 DIAGNOSIS — R60.0 BILATERAL LEG EDEMA: ICD-10-CM

## 2024-04-29 DIAGNOSIS — J43.9 PULMONARY EMPHYSEMA, UNSPECIFIED EMPHYSEMA TYPE (MULTI): ICD-10-CM

## 2024-04-29 DIAGNOSIS — G89.29 CHRONIC LOW BACK PAIN WITHOUT SCIATICA, UNSPECIFIED BACK PAIN LATERALITY: ICD-10-CM

## 2024-04-29 DIAGNOSIS — S22.009D CLOSED FRACTURE OF MULTIPLE THORACIC VERTEBRAE WITH ROUTINE HEALING, SUBSEQUENT ENCOUNTER: ICD-10-CM

## 2024-04-29 DIAGNOSIS — I10 PRIMARY HYPERTENSION: ICD-10-CM

## 2024-04-29 PROCEDURE — 1159F MED LIST DOCD IN RCRD: CPT

## 2024-04-29 PROCEDURE — 99423 OL DIG E/M SVC 21+ MIN: CPT

## 2024-04-29 RX ORDER — MELOXICAM 15 MG/1
15 TABLET ORAL DAILY
Qty: 90 TABLET | Refills: 1 | Status: SHIPPED | OUTPATIENT
Start: 2024-04-29 | End: 2025-04-29

## 2024-04-29 RX ORDER — FLUTICASONE PROPIONATE 50 MCG
1 SPRAY, SUSPENSION (ML) NASAL DAILY
Qty: 16 G | Refills: 5 | Status: SHIPPED | OUTPATIENT
Start: 2024-04-29 | End: 2025-04-29

## 2024-04-29 RX ORDER — MINERAL OIL
180 ENEMA (ML) RECTAL DAILY
Qty: 90 TABLET | Refills: 3 | Status: SHIPPED | OUTPATIENT
Start: 2024-04-29 | End: 2025-04-29

## 2024-04-29 ASSESSMENT — ENCOUNTER SYMPTOMS
CARDIOVASCULAR NEGATIVE: 1
GASTROINTESTINAL NEGATIVE: 1
CONSTITUTIONAL NEGATIVE: 1
RESPIRATORY NEGATIVE: 1

## 2024-04-29 ASSESSMENT — PATIENT HEALTH QUESTIONNAIRE - PHQ9
SUM OF ALL RESPONSES TO PHQ9 QUESTIONS 1 AND 2: 0
2. FEELING DOWN, DEPRESSED OR HOPELESS: NOT AT ALL
1. LITTLE INTEREST OR PLEASURE IN DOING THINGS: NOT AT ALL

## 2024-04-29 NOTE — PROGRESS NOTES
Subjective   Patient ID: Ray Dover is a 69 y.o. male who presents virtually for 3 month med check , ears feel plugged , sinus congestion .    HPI   COPD/HYPOXEMIA: Seems to be doing ok on current medications for his COPD, pulse ox usually low 90s at home. Using 3 liters of O2 continuous. Inhalers effective. Pulse Ox today is 92%. Following with pulmonology at [facility] in [place].  OSTEOPOROSIS/MULTIPLE FRACTURE THORACIC SPINE: Last DEXA 6/30/17. Would not like another DEXA, takes vit D and calcium daily.   LOW BACK PAIN: He endorses low back chronically, Xray 5/27/22 showed age-indeterminate compression deformities in mid-thoracic spine. Hard to sleep with low back pain, endorses 4-5/10 pain. Cyclobenzaprine effective. Meloxicam helpful as well.   AFIB: Following with [facility] cardiology. Vitals stable.  GERD: Omeprazole effective, stable.   BL LEG EDEMA: S/P L femur repair per Dr. Foss 5/27/22. Still with some residual BL LE edema. Does not wear compression stockings, Lasix twice weekly.  ANXIETY: Compliant with Effexor 75mg daily, no SE.  ALLERGIES: Allegra prn, has been using Afrin daily for some time. Still with daily congestion.    Positive Cologuard 2023, colonoscopy per Dr. Mancilla 2024 showed multiple polyps, will have another 2025.  Flu vaccinated.  Pneumo vaccinated.    Review of Systems   Constitutional: Negative.    Respiratory: Negative.     Cardiovascular: Negative.    Gastrointestinal: Negative.        Objective   There were no vitals taken for this visit.    Physical Exam  Unable to perform d/t nature of visit.    Assessment/Plan        Advised stop Afrin, start Flonase and Allegra daily.  No other medication changes today.  Chronic conditions seem stable.  Follow up 6 months in office an will get labs then.

## 2024-05-06 ENCOUNTER — APPOINTMENT (OUTPATIENT)
Dept: PRIMARY CARE | Facility: CLINIC | Age: 69
End: 2024-05-06
Payer: COMMERCIAL

## 2024-08-14 ENCOUNTER — TELEPHONE (OUTPATIENT)
Dept: PRIMARY CARE | Facility: CLINIC | Age: 69
End: 2024-08-14
Payer: COMMERCIAL

## 2024-08-14 DIAGNOSIS — J44.9 CHRONIC OBSTRUCTIVE PULMONARY DISEASE, UNSPECIFIED COPD TYPE (MULTI): ICD-10-CM

## 2024-08-14 RX ORDER — ALBUTEROL SULFATE 90 UG/1
2 INHALANT RESPIRATORY (INHALATION) EVERY 4 HOURS PRN
Qty: 18 G | Refills: 3 | Status: CANCELLED | OUTPATIENT
Start: 2024-08-14 | End: 2025-08-14

## 2024-08-14 RX ORDER — ALBUTEROL SULFATE 0.83 MG/ML
2.5 SOLUTION RESPIRATORY (INHALATION) EVERY 6 HOURS PRN
Qty: 450 ML | Refills: 3 | Status: SHIPPED | OUTPATIENT
Start: 2024-08-14 | End: 2025-08-14

## 2024-08-14 NOTE — TELEPHONE ENCOUNTER
Please send refill to  Pharmacy    [facility] - [place], [place] - [address]  [address], [facility] [address]  Phone: [phone]  Fax: [phone]     albuterol 2.5 mg /3 mL (0.083 %) nebulizer solution [

## 2024-10-29 ENCOUNTER — LAB (OUTPATIENT)
Dept: LAB | Facility: LAB | Age: 69
End: 2024-10-29
Payer: COMMERCIAL

## 2024-10-29 ENCOUNTER — APPOINTMENT (OUTPATIENT)
Dept: PRIMARY CARE | Facility: CLINIC | Age: 69
End: 2024-10-29
Payer: COMMERCIAL

## 2024-10-29 VITALS
OXYGEN SATURATION: 89 % | DIASTOLIC BLOOD PRESSURE: 64 MMHG | HEART RATE: 73 BPM | SYSTOLIC BLOOD PRESSURE: 118 MMHG | BODY MASS INDEX: 26.95 KG/M2 | WEIGHT: 199 LBS | HEIGHT: 72 IN

## 2024-10-29 DIAGNOSIS — K21.9 GASTROESOPHAGEAL REFLUX DISEASE WITHOUT ESOPHAGITIS: ICD-10-CM

## 2024-10-29 DIAGNOSIS — J30.2 SEASONAL ALLERGIES: ICD-10-CM

## 2024-10-29 DIAGNOSIS — R97.20 ELEVATED PSA: ICD-10-CM

## 2024-10-29 DIAGNOSIS — J44.9 CHRONIC OBSTRUCTIVE PULMONARY DISEASE, UNSPECIFIED COPD TYPE (MULTI): ICD-10-CM

## 2024-10-29 DIAGNOSIS — G89.29 CHRONIC LOW BACK PAIN WITHOUT SCIATICA, UNSPECIFIED BACK PAIN LATERALITY: ICD-10-CM

## 2024-10-29 DIAGNOSIS — Z12.5 SCREENING FOR PROSTATE CANCER: ICD-10-CM

## 2024-10-29 DIAGNOSIS — I10 PRIMARY HYPERTENSION: ICD-10-CM

## 2024-10-29 DIAGNOSIS — Z13.220 SCREENING FOR CHOLESTEROL LEVEL: ICD-10-CM

## 2024-10-29 DIAGNOSIS — Z00.00 ROUTINE GENERAL MEDICAL EXAMINATION AT HEALTH CARE FACILITY: Primary | ICD-10-CM

## 2024-10-29 DIAGNOSIS — M54.50 CHRONIC LOW BACK PAIN WITHOUT SCIATICA, UNSPECIFIED BACK PAIN LATERALITY: ICD-10-CM

## 2024-10-29 DIAGNOSIS — R60.0 BILATERAL LEG EDEMA: ICD-10-CM

## 2024-10-29 DIAGNOSIS — I48.0 PAROXYSMAL ATRIAL FIBRILLATION (MULTI): ICD-10-CM

## 2024-10-29 LAB
ALBUMIN SERPL BCP-MCNC: 4.3 G/DL (ref 3.4–5)
ALP SERPL-CCNC: 113 U/L (ref 33–136)
ALT SERPL W P-5'-P-CCNC: 23 U/L (ref 10–52)
ANION GAP SERPL CALC-SCNC: 12 MMOL/L (ref 10–20)
AST SERPL W P-5'-P-CCNC: 24 U/L (ref 9–39)
BASOPHILS # BLD AUTO: 0.04 X10*3/UL (ref 0–0.1)
BASOPHILS NFR BLD AUTO: 0.4 %
BILIRUB SERPL-MCNC: 0.5 MG/DL (ref 0–1.2)
BUN SERPL-MCNC: 12 MG/DL (ref 6–23)
CALCIUM SERPL-MCNC: 9.9 MG/DL (ref 8.6–10.3)
CHLORIDE SERPL-SCNC: 101 MMOL/L (ref 98–107)
CHOLEST SERPL-MCNC: 167 MG/DL (ref 0–199)
CHOLESTEROL/HDL RATIO: 3
CO2 SERPL-SCNC: 32 MMOL/L (ref 21–32)
CREAT SERPL-MCNC: 0.7 MG/DL (ref 0.5–1.3)
EGFRCR SERPLBLD CKD-EPI 2021: >90 ML/MIN/1.73M*2
EOSINOPHIL # BLD AUTO: 0.14 X10*3/UL (ref 0–0.4)
EOSINOPHIL NFR BLD AUTO: 1.4 %
ERYTHROCYTE [DISTWIDTH] IN BLOOD BY AUTOMATED COUNT: 13.2 % (ref 11.5–14.5)
GLUCOSE SERPL-MCNC: 103 MG/DL (ref 74–99)
HCT VFR BLD AUTO: 38.3 % (ref 41–52)
HDLC SERPL-MCNC: 55 MG/DL
HGB BLD-MCNC: 12.6 G/DL (ref 13.5–17.5)
IMM GRANULOCYTES # BLD AUTO: 0.03 X10*3/UL (ref 0–0.5)
IMM GRANULOCYTES NFR BLD AUTO: 0.3 % (ref 0–0.9)
LDLC SERPL CALC-MCNC: 80 MG/DL
LYMPHOCYTES # BLD AUTO: 2.54 X10*3/UL (ref 0.8–3)
LYMPHOCYTES NFR BLD AUTO: 25.1 %
MCH RBC QN AUTO: 32.9 PG (ref 26–34)
MCHC RBC AUTO-ENTMCNC: 32.9 G/DL (ref 32–36)
MCV RBC AUTO: 100 FL (ref 80–100)
MONOCYTES # BLD AUTO: 1 X10*3/UL (ref 0.05–0.8)
MONOCYTES NFR BLD AUTO: 9.9 %
NEUTROPHILS # BLD AUTO: 6.35 X10*3/UL (ref 1.6–5.5)
NEUTROPHILS NFR BLD AUTO: 62.9 %
NON HDL CHOLESTEROL: 112 MG/DL (ref 0–149)
NRBC BLD-RTO: 0 /100 WBCS (ref 0–0)
PLATELET # BLD AUTO: 406 X10*3/UL (ref 150–450)
POTASSIUM SERPL-SCNC: 4.6 MMOL/L (ref 3.5–5.3)
PROT SERPL-MCNC: 6.7 G/DL (ref 6.4–8.2)
PSA SERPL-MCNC: 5.92 NG/ML
RBC # BLD AUTO: 3.83 X10*6/UL (ref 4.5–5.9)
SODIUM SERPL-SCNC: 140 MMOL/L (ref 136–145)
TRIGL SERPL-MCNC: 161 MG/DL (ref 0–149)
VLDL: 32 MG/DL (ref 0–40)
WBC # BLD AUTO: 10.1 X10*3/UL (ref 4.4–11.3)

## 2024-10-29 PROCEDURE — 85025 COMPLETE CBC W/AUTO DIFF WBC: CPT

## 2024-10-29 PROCEDURE — 80053 COMPREHEN METABOLIC PANEL: CPT

## 2024-10-29 PROCEDURE — 3078F DIAST BP <80 MM HG: CPT

## 2024-10-29 PROCEDURE — 3074F SYST BP LT 130 MM HG: CPT

## 2024-10-29 PROCEDURE — 99214 OFFICE O/P EST MOD 30 MIN: CPT

## 2024-10-29 PROCEDURE — 84153 ASSAY OF PSA TOTAL: CPT

## 2024-10-29 PROCEDURE — G0439 PPPS, SUBSEQ VISIT: HCPCS

## 2024-10-29 PROCEDURE — 1170F FXNL STATUS ASSESSED: CPT

## 2024-10-29 PROCEDURE — 1124F ACP DISCUSS-NO DSCNMKR DOCD: CPT

## 2024-10-29 PROCEDURE — 1159F MED LIST DOCD IN RCRD: CPT

## 2024-10-29 PROCEDURE — 3008F BODY MASS INDEX DOCD: CPT

## 2024-10-29 PROCEDURE — 36415 COLL VENOUS BLD VENIPUNCTURE: CPT

## 2024-10-29 PROCEDURE — 80061 LIPID PANEL: CPT

## 2024-10-29 RX ORDER — FLUTICASONE PROPIONATE 50 MCG
1 SPRAY, SUSPENSION (ML) NASAL DAILY
Qty: 16 G | Refills: 5 | Status: SHIPPED | OUTPATIENT
Start: 2024-10-29 | End: 2025-10-29

## 2024-10-29 RX ORDER — CYCLOBENZAPRINE HCL 10 MG
10 TABLET ORAL 2 TIMES DAILY PRN
Qty: 60 TABLET | Refills: 2 | Status: SHIPPED | OUTPATIENT
Start: 2024-10-29 | End: 2025-10-29

## 2024-10-29 RX ORDER — FLUTICASONE FUROATE, UMECLIDINIUM BROMIDE AND VILANTEROL TRIFENATATE 200; 62.5; 25 UG/1; UG/1; UG/1
1 POWDER RESPIRATORY (INHALATION)
COMMUNITY
Start: 2024-10-04

## 2024-10-29 ASSESSMENT — PATIENT HEALTH QUESTIONNAIRE - PHQ9
1. LITTLE INTEREST OR PLEASURE IN DOING THINGS: NOT AT ALL
SUM OF ALL RESPONSES TO PHQ9 QUESTIONS 1 AND 2: 0
2. FEELING DOWN, DEPRESSED OR HOPELESS: NOT AT ALL

## 2024-10-29 ASSESSMENT — ENCOUNTER SYMPTOMS
RESPIRATORY NEGATIVE: 1
CARDIOVASCULAR NEGATIVE: 1
GASTROINTESTINAL NEGATIVE: 1
CONSTITUTIONAL NEGATIVE: 1

## 2024-10-29 ASSESSMENT — ACTIVITIES OF DAILY LIVING (ADL)
DOING_HOUSEWORK: NEEDS ASSISTANCE
GROCERY_SHOPPING: NEEDS ASSISTANCE
TAKING_MEDICATION: INDEPENDENT
BATHING: INDEPENDENT
DRESSING: INDEPENDENT
MANAGING_FINANCES: INDEPENDENT

## 2024-11-15 DIAGNOSIS — J44.9 CHRONIC OBSTRUCTIVE PULMONARY DISEASE, UNSPECIFIED COPD TYPE (MULTI): ICD-10-CM

## 2024-11-15 RX ORDER — ALBUTEROL SULFATE 90 UG/1
INHALANT RESPIRATORY (INHALATION)
Qty: 51 G | Refills: 2 | OUTPATIENT
Start: 2024-11-15

## 2024-11-18 DIAGNOSIS — J43.9 PULMONARY EMPHYSEMA, UNSPECIFIED EMPHYSEMA TYPE (MULTI): Primary | ICD-10-CM

## 2024-11-18 RX ORDER — ALBUTEROL SULFATE 90 UG/1
2 INHALANT RESPIRATORY (INHALATION) EVERY 6 HOURS PRN
Qty: 18 G | Refills: 11 | Status: SHIPPED | OUTPATIENT
Start: 2024-11-18 | End: 2025-11-18

## 2024-11-18 RX ORDER — ALBUTEROL SULFATE 90 UG/1
2 INHALANT RESPIRATORY (INHALATION) EVERY 4 HOURS PRN
Qty: 18 G | Refills: 0 | Status: SHIPPED | OUTPATIENT
Start: 2024-11-18 | End: 2025-11-18

## 2024-11-18 NOTE — TELEPHONE ENCOUNTER
Pt wife is requesting refill for albuterol inhaler one sent to [facility] and another to [facility]    Refill proposed

## 2024-11-25 ENCOUNTER — TELEPHONE (OUTPATIENT)
Dept: PRIMARY CARE | Facility: CLINIC | Age: 69
End: 2024-11-25

## 2024-11-25 DIAGNOSIS — F41.9 ANXIETY: ICD-10-CM

## 2024-11-25 DIAGNOSIS — K21.9 GASTROESOPHAGEAL REFLUX DISEASE WITHOUT ESOPHAGITIS: ICD-10-CM

## 2024-11-26 RX ORDER — OMEPRAZOLE 20 MG/1
20 CAPSULE, DELAYED RELEASE ORAL DAILY
Qty: 60 CAPSULE | Refills: 5 | OUTPATIENT
Start: 2024-11-26

## 2024-11-26 RX ORDER — VENLAFAXINE HYDROCHLORIDE 75 MG/1
75 CAPSULE, EXTENDED RELEASE ORAL DAILY
Qty: 90 CAPSULE | Refills: 3 | OUTPATIENT
Start: 2024-11-26

## 2024-12-06 NOTE — TELEPHONE ENCOUNTER
Wife stating she is suppose to start a work program. When hearing about this the patient started having a major anxiety attack. Asking if a letter can be written stating she takes care of this patient. Asking for a call back. [phone]

## 2025-01-13 ENCOUNTER — TELEPHONE (OUTPATIENT)
Dept: PRIMARY CARE | Facility: CLINIC | Age: 70
End: 2025-01-13
Payer: COMMERCIAL

## 2025-01-13 DIAGNOSIS — J06.9 UPPER RESPIRATORY TRACT INFECTION, UNSPECIFIED TYPE: Primary | ICD-10-CM

## 2025-01-13 RX ORDER — AZITHROMYCIN 250 MG/1
TABLET, FILM COATED ORAL
Qty: 6 TABLET | Refills: 0 | Status: SHIPPED | OUTPATIENT
Start: 2025-01-13 | End: 2025-01-18

## 2025-01-13 RX ORDER — METHYLPREDNISOLONE 4 MG/1
TABLET ORAL
Qty: 21 TABLET | Refills: 0 | Status: SHIPPED | OUTPATIENT
Start: 2025-01-13 | End: 2025-01-19

## 2025-01-13 NOTE — TELEPHONE ENCOUNTER
Pt wife called stating it seems like Ray is catching a cold and would like to have some prednisone sent in to catch this early    [facility]

## 2025-01-27 DIAGNOSIS — I10 HYPERTENSION, UNSPECIFIED TYPE: ICD-10-CM

## 2025-01-27 DIAGNOSIS — R06.02 SHORTNESS OF BREATH ON EXERTION: ICD-10-CM

## 2025-01-27 RX ORDER — ZAFIRLUKAST 20 MG/1
20 TABLET, FILM COATED ORAL 2 TIMES DAILY
Qty: 180 TABLET | Refills: 3 | Status: SHIPPED | OUTPATIENT
Start: 2025-01-27 | End: 2026-01-27

## 2025-01-27 RX ORDER — DILTIAZEM HYDROCHLORIDE 180 MG/1
180 CAPSULE, EXTENDED RELEASE ORAL DAILY
Qty: 90 CAPSULE | Refills: 3 | Status: SHIPPED | OUTPATIENT
Start: 2025-01-27 | End: 2026-01-27

## 2025-02-12 ENCOUNTER — TELEPHONE (OUTPATIENT)
Dept: PRIMARY CARE | Facility: CLINIC | Age: 70
End: 2025-02-12
Payer: COMMERCIAL

## 2025-02-13 DIAGNOSIS — J44.9 CHRONIC OBSTRUCTIVE PULMONARY DISEASE, UNSPECIFIED COPD TYPE (MULTI): ICD-10-CM

## 2025-02-13 RX ORDER — ALBUTEROL SULFATE 0.83 MG/ML
2.5 SOLUTION RESPIRATORY (INHALATION) EVERY 6 HOURS PRN
Qty: 450 ML | Refills: 3 | Status: SHIPPED | OUTPATIENT
Start: 2025-02-13 | End: 2026-02-13

## 2025-02-20 ENCOUNTER — APPOINTMENT (OUTPATIENT)
Dept: SURGERY | Facility: CLINIC | Age: 70
End: 2025-02-20
Payer: COMMERCIAL

## 2025-03-03 ENCOUNTER — APPOINTMENT (OUTPATIENT)
Dept: SURGERY | Facility: CLINIC | Age: 70
End: 2025-03-03
Payer: COMMERCIAL

## 2025-03-03 ENCOUNTER — DOCUMENTATION (OUTPATIENT)
Dept: SURGERY | Facility: CLINIC | Age: 70
End: 2025-03-03

## 2025-03-03 VITALS
DIASTOLIC BLOOD PRESSURE: 64 MMHG | WEIGHT: 196.2 LBS | HEART RATE: 73 BPM | SYSTOLIC BLOOD PRESSURE: 110 MMHG | BODY MASS INDEX: 26.57 KG/M2 | HEIGHT: 72 IN

## 2025-03-03 DIAGNOSIS — Z86.0100 HISTORY OF COLON POLYPS: Primary | ICD-10-CM

## 2025-03-03 PROCEDURE — 1159F MED LIST DOCD IN RCRD: CPT | Performed by: SURGERY

## 2025-03-03 PROCEDURE — 99213 OFFICE O/P EST LOW 20 MIN: CPT | Performed by: SURGERY

## 2025-03-03 PROCEDURE — 3008F BODY MASS INDEX DOCD: CPT | Performed by: SURGERY

## 2025-03-03 PROCEDURE — 3078F DIAST BP <80 MM HG: CPT | Performed by: SURGERY

## 2025-03-03 PROCEDURE — 3074F SYST BP LT 130 MM HG: CPT | Performed by: SURGERY

## 2025-03-03 PROCEDURE — G2211 COMPLEX E/M VISIT ADD ON: HCPCS | Performed by: SURGERY

## 2025-03-03 NOTE — PROGRESS NOTES
General Surgery Follow-up Visit    Patient: Ray Dover  : 1955  MRN: [id number]  Date of Visit: 25    Chief Complaint: History of colon polyps    History of Present Illness: Ray Dover is a 70 y.o. old male due for surveillance colonoscopy. He had a colonoscopy on 3/5/24 performed in workup of positive a Cologuard test.  That was his first colonoscopy.  He has no family history of colon or rectal cancer or inflammatory bowel disease.  He had 9 subcentimeter polyps removed. These were tubular adenomas, and some were pseudopolyps. However, his prep was not great and he also had colonic spasms.  Therefore, 1 year surveillance colonoscopy was recommended.  He continues to have bowel movements once daily.  He is not on any stool softeners, fiber supplements, or laxatives.  He denies any blood in the stool.  He has no family history of colon or rectal cancer or inflammatory bowel disease.  He is not on any blood thinners or antiplatelet agents.  From a pulmonary standpoint, he is on home oxygen.  He wears 3-4 L at rest, and up to 5 L when ambulating.    Medical History:  COPD, on supplemental oxygen  Sleep apnea  Hypertension  Anxiety     Surgical History:  Colonoscopy, 3/5/24, 9 polyps removed  Tooth extraction  Repair of femur fracture  Cataract surgery  EGD    Home Medications:  Prior to Admission medications    Medication Sig Start Date End Date Taking? Authorizing Provider   albuterol (Proventil HFA) 90 mcg/actuation inhaler Inhale 2 puffs every 6 hours if needed for wheezing. 24  Thee Gilbert PA-C   albuterol 2.5 mg /3 mL (0.083 %) nebulizer solution Take 3 mL (2.5 mg) by nebulization every 6 hours if needed for wheezing or shortness of breath. 25  Thee Gilbert PA-C   albuterol 90 mcg/actuation inhaler Inhale 2 puffs every 4 hours if needed for shortness of breath. 24  Thee Gilbert PA-C   cholecalciferol (Vitamin D-3) 25 MCG (1000 UT) tablet  Vitamin D (Cholecalciferol) 25 MCG (1000 UT) Oral Tablet   Refills: 0       Active    Historical Provider, MD   cyclobenzaprine (Flexeril) 10 mg tablet Take 1 tablet (10 mg) by mouth 2 times a day as needed for muscle spasms. 10/29/24 10/29/25  Thee Gilbert PA-C   dilTIAZem ER (Tiazac) 180 mg 24 hr capsule Take 1 capsule (180 mg) by mouth once daily. 1/27/25 1/27/26  Thee Gilbert PA-C   fexofenadine (Allegra) 180 mg tablet Take 1 tablet (180 mg) by mouth once daily. 4/29/24 4/29/25  Thee Gilbert PA-C   fluticasone (Flonase) 50 mcg/actuation nasal spray Administer 1 spray into each nostril once daily. Shake gently. Before first use, prime pump. After use, clean tip and replace cap. 10/29/24 10/29/25  Thee Gilbert PA-C   furosemide (Lasix) 20 mg tablet Take 1 tablet (20 mg) by mouth once daily. 1/18/24 1/17/25  Thee Gilbert PA-C   multivitamin tablet Multi-Vitamins TABS   Refills: 0       Active    Historical Provider, MD   omeprazole (PriLOSEC) 20 mg DR capsule Take 1 capsule (20 mg) by mouth once daily. 1/18/24 1/17/25  Thee Gilbert PA-C   Trelegy Ellipta 200-62.5-25 mcg blister with device Inhale 1 puff once daily. 10/4/24   Historical Provider, MD   venlafaxine XR (Effexor-XR) 75 mg 24 hr capsule Take 1 capsule (75 mg) by mouth once daily. Do not crush or chew. 1/18/24 1/17/25  Thee Gilbert PA-C   zafirlukast (Accolate) 20 mg tablet Take 1 tablet (20 mg) by mouth 2 times a day. 1/27/25 1/27/26  Thee Gilbert PA-C     Allergies:  Budesonide-formoterol and bee venom protein (honey bee).     Family History:   No family history of colon or rectal cancer or inflammatory bowel disease.     Social History:  Smoker.  Moderate alcohol use. No drug use.     ROS:  Constitutional:  no fever, sweats, and chills  Cardiovascular: + Irregular heartbeat, swelling in ankles  Respiratory: + Wheezing, emphysema, sleep apnea, on supplemental oxygen  Gastrointestinal: + No blood in the stool or change in bowel habits.   No abdominal pain.  Genitourinary: no dysuria  Musculoskeletal: + Osteoporosis, history of broken bones  Integumentary: no rashes  Neurological: no confusion  Psychiatric: + Anxiety  Endocrine: no heat or cold intolerance  Heme/Lymph: no easy bruising or bleeding    Objective:  /64   Pulse 73   Ht 1.829 m (6')   Wt 89 kg (196 lb 3.2 oz)   BMI 26.61 kg/m²     Physical Exam:  Constitutional: No acute distress, conversant, pleasant, accompanied by his wife  Neurologic: alert and oriented  Psych: appropriate affect  Ears, Nose, Mouth and Throat: mucus membranes moist  Pulmonary: No labored breathing, on 5L supplemental oxygen via nasal cannula  Cardiovascular: Regular rate and rhythm  Abdomen: Non-distended, BMI 27  Musculoskeletal: Moves all extremities, no edema  Skin: no jaundice    Labs:  Hgb 12.6 on 10/29/24     Imaging:  No pertinent imaging available for review.    Assessment and Plan: Ray Dover is a 70 y.o. old male with history of colon polyps, due for surveillance colonoscopy.  We discussed the risks of this procedure.  This included risks of bleeding, perforation, missed polyps, incomplete colonoscopy, and potential need for additional procedures pending findings.  The patient was agreeable to proceed.  Bowel prep instructions were reviewed and all questions were answered.  The patient is scheduled for colonoscopy on 4/8/25.  We will perform this at [facility] with assistance of Anesthesiology given his history of sleep apnea and COPD.    rKistal Mancilla MD  3/3/2025

## 2025-03-03 NOTE — PROGRESS NOTES
Notified patient of  Colonoscopy  scheduled on  4-8-25 .Instructions reviewed. Advised patient [facility] will contact them 24 hours before surgery with arrival time. Pt voices understanding. Minal Davis MA.

## 2025-03-10 ENCOUNTER — TELEPHONE (OUTPATIENT)
Dept: PRIMARY CARE | Facility: CLINIC | Age: 70
End: 2025-03-10
Payer: COMMERCIAL

## 2025-03-10 DIAGNOSIS — J06.9 UPPER RESPIRATORY TRACT INFECTION, UNSPECIFIED TYPE: Primary | ICD-10-CM

## 2025-03-10 RX ORDER — PREDNISONE 20 MG/1
TABLET ORAL
Qty: 18 TABLET | Refills: 0 | Status: SHIPPED | OUTPATIENT
Start: 2025-03-10

## 2025-03-10 RX ORDER — DOXYCYCLINE 100 MG/1
100 CAPSULE ORAL 2 TIMES DAILY
Qty: 28 CAPSULE | Refills: 0 | Status: SHIPPED | OUTPATIENT
Start: 2025-03-10 | End: 2025-03-24

## 2025-03-10 NOTE — TELEPHONE ENCOUNTER
Pt c/o sinus pain, chest congestion, dry cough, sinus drainage no fever  sx x 3 days  Would like prednisone and possible antibiotic sent in to [facility]

## 2025-04-07 ENCOUNTER — ANESTHESIA EVENT (OUTPATIENT)
Dept: OPERATING ROOM | Facility: HOSPITAL | Age: 70
End: 2025-04-07
Payer: COMMERCIAL

## 2025-04-08 ENCOUNTER — ANESTHESIA (OUTPATIENT)
Dept: OPERATING ROOM | Facility: HOSPITAL | Age: 70
End: 2025-04-08
Payer: COMMERCIAL

## 2025-04-08 ENCOUNTER — HOSPITAL ENCOUNTER (OUTPATIENT)
Dept: OPERATING ROOM | Facility: HOSPITAL | Age: 70
Setting detail: OUTPATIENT SURGERY
Discharge: HOME | End: 2025-04-08
Payer: COMMERCIAL

## 2025-04-08 VITALS
HEIGHT: 72 IN | WEIGHT: 185.41 LBS | BODY MASS INDEX: 25.11 KG/M2 | HEART RATE: 89 BPM | RESPIRATION RATE: 20 BRPM | DIASTOLIC BLOOD PRESSURE: 72 MMHG | TEMPERATURE: 98.1 F | OXYGEN SATURATION: 96 % | SYSTOLIC BLOOD PRESSURE: 108 MMHG

## 2025-04-08 DIAGNOSIS — Z86.0100 HISTORY OF COLON POLYPS: ICD-10-CM

## 2025-04-08 PROCEDURE — 3700000002 HC GENERAL ANESTHESIA TIME - EACH INCREMENTAL 1 MINUTE: Performed by: ANESTHESIOLOGY

## 2025-04-08 PROCEDURE — 3600000007 HC OR TIME - EACH INCREMENTAL 1 MINUTE - PROCEDURE LEVEL TWO: Performed by: ANESTHESIOLOGY

## 2025-04-08 PROCEDURE — 2500000004 HC RX 250 GENERAL PHARMACY W/ HCPCS (ALT 636 FOR OP/ED): Mod: JZ | Performed by: SURGERY

## 2025-04-08 PROCEDURE — 45385 COLONOSCOPY W/LESION REMOVAL: CPT | Performed by: SURGERY

## 2025-04-08 PROCEDURE — 3600000002 HC OR TIME - INITIAL BASE CHARGE - PROCEDURE LEVEL TWO: Performed by: ANESTHESIOLOGY

## 2025-04-08 PROCEDURE — 2500000002 HC RX 250 W HCPCS SELF ADMINISTERED DRUGS (ALT 637 FOR MEDICARE OP, ALT 636 FOR OP/ED): Performed by: ANESTHESIOLOGY

## 2025-04-08 PROCEDURE — 3700000001 HC GENERAL ANESTHESIA TIME - INITIAL BASE CHARGE: Performed by: ANESTHESIOLOGY

## 2025-04-08 PROCEDURE — 7100000010 HC PHASE TWO TIME - EACH INCREMENTAL 1 MINUTE: Performed by: ANESTHESIOLOGY

## 2025-04-08 PROCEDURE — 7100000009 HC PHASE TWO TIME - INITIAL BASE CHARGE: Performed by: ANESTHESIOLOGY

## 2025-04-08 PROCEDURE — 2500000005 HC RX 250 GENERAL PHARMACY W/O HCPCS: Performed by: ANESTHESIOLOGY

## 2025-04-08 PROCEDURE — 94664 DEMO&/EVAL PT USE INHALER: CPT

## 2025-04-08 PROCEDURE — 2500000004 HC RX 250 GENERAL PHARMACY W/ HCPCS (ALT 636 FOR OP/ED): Performed by: ANESTHESIOLOGY

## 2025-04-08 PROCEDURE — 45380 COLONOSCOPY AND BIOPSY: CPT | Performed by: SURGERY

## 2025-04-08 RX ORDER — SODIUM CHLORIDE, SODIUM LACTATE, POTASSIUM CHLORIDE, CALCIUM CHLORIDE 600; 310; 30; 20 MG/100ML; MG/100ML; MG/100ML; MG/100ML
100 INJECTION, SOLUTION INTRAVENOUS CONTINUOUS
Status: DISCONTINUED | OUTPATIENT
Start: 2025-04-08 | End: 2025-04-09 | Stop reason: HOSPADM

## 2025-04-08 RX ORDER — PROPOFOL 10 MG/ML
INJECTION, EMULSION INTRAVENOUS AS NEEDED
Status: DISCONTINUED | OUTPATIENT
Start: 2025-04-08 | End: 2025-04-08

## 2025-04-08 RX ORDER — LIDOCAINE HYDROCHLORIDE 10 MG/ML
INJECTION, SOLUTION EPIDURAL; INFILTRATION; INTRACAUDAL; PERINEURAL AS NEEDED
Status: DISCONTINUED | OUTPATIENT
Start: 2025-04-08 | End: 2025-04-08

## 2025-04-08 RX ORDER — ALBUTEROL SULFATE 0.83 MG/ML
2.5 SOLUTION RESPIRATORY (INHALATION) ONCE
Status: COMPLETED | OUTPATIENT
Start: 2025-04-08 | End: 2025-04-08

## 2025-04-08 RX ORDER — ONDANSETRON HYDROCHLORIDE 2 MG/ML
4 INJECTION, SOLUTION INTRAVENOUS EVERY 12 HOURS PRN
Status: DISCONTINUED | OUTPATIENT
Start: 2025-04-08 | End: 2025-04-09 | Stop reason: HOSPADM

## 2025-04-08 RX ORDER — MIDAZOLAM HYDROCHLORIDE 1 MG/ML
2 INJECTION INTRAMUSCULAR; INTRAVENOUS ONCE AS NEEDED
Status: COMPLETED | OUTPATIENT
Start: 2025-04-08 | End: 2025-04-08

## 2025-04-08 RX ADMIN — PROPOFOL 30 MG: 10 INJECTION, EMULSION INTRAVENOUS at 11:00

## 2025-04-08 RX ADMIN — PROPOFOL 30 MG: 10 INJECTION, EMULSION INTRAVENOUS at 10:57

## 2025-04-08 RX ADMIN — Medication 20 MG: at 11:20

## 2025-04-08 RX ADMIN — PROPOFOL 30 MG: 10 INJECTION, EMULSION INTRAVENOUS at 11:05

## 2025-04-08 RX ADMIN — MIDAZOLAM HYDROCHLORIDE 2 MG: 1 INJECTION, SOLUTION INTRAMUSCULAR; INTRAVENOUS at 10:55

## 2025-04-08 RX ADMIN — LIDOCAINE HYDROCHLORIDE 50 MG: 10 INJECTION, SOLUTION EPIDURAL; INFILTRATION; INTRACAUDAL; PERINEURAL at 10:56

## 2025-04-08 RX ADMIN — Medication 15 MG: at 11:15

## 2025-04-08 RX ADMIN — PROPOFOL 20 MG: 10 INJECTION, EMULSION INTRAVENOUS at 11:07

## 2025-04-08 RX ADMIN — Medication 15 MG: at 11:28

## 2025-04-08 RX ADMIN — ALBUTEROL SULFATE 2.5 MG: 2.5 SOLUTION RESPIRATORY (INHALATION) at 10:46

## 2025-04-08 RX ADMIN — SODIUM CHLORIDE, SODIUM LACTATE, POTASSIUM CHLORIDE, AND CALCIUM CHLORIDE 100 ML/HR: .6; .31; .03; .02 INJECTION, SOLUTION INTRAVENOUS at 08:56

## 2025-04-08 RX ADMIN — PROPOFOL 30 MG: 10 INJECTION, EMULSION INTRAVENOUS at 10:56

## 2025-04-08 RX ADMIN — PROPOFOL 30 MG: 10 INJECTION, EMULSION INTRAVENOUS at 11:01

## 2025-04-08 RX ADMIN — GLUCAGON 1 MG: KIT at 10:57

## 2025-04-08 RX ADMIN — PROPOFOL 30 MG: 10 INJECTION, EMULSION INTRAVENOUS at 11:04

## 2025-04-08 RX ADMIN — Medication 15 MG: at 11:10

## 2025-04-08 RX ADMIN — Medication 15 MG: at 11:25

## 2025-04-08 SDOH — HEALTH STABILITY: MENTAL HEALTH: CURRENT SMOKER: 1

## 2025-04-08 ASSESSMENT — PAIN - FUNCTIONAL ASSESSMENT
PAIN_FUNCTIONAL_ASSESSMENT: 0-10

## 2025-04-08 ASSESSMENT — COLUMBIA-SUICIDE SEVERITY RATING SCALE - C-SSRS
2. HAVE YOU ACTUALLY HAD ANY THOUGHTS OF KILLING YOURSELF?: NO
1. IN THE PAST MONTH, HAVE YOU WISHED YOU WERE DEAD OR WISHED YOU COULD GO TO SLEEP AND NOT WAKE UP?: NO
6. HAVE YOU EVER DONE ANYTHING, STARTED TO DO ANYTHING, OR PREPARED TO DO ANYTHING TO END YOUR LIFE?: NO

## 2025-04-08 ASSESSMENT — PAIN SCALES - GENERAL
PAINLEVEL_OUTOF10: 0 - NO PAIN

## 2025-04-08 NOTE — ANESTHESIA PREPROCEDURE EVALUATION
Patient: Ray Dover    Procedure Information       Date/Time: 04/08/25 1015    Scheduled providers: Kristal Mancilla MD; Ivy Wong, RN; Con Salvador RN    Procedure: COLONOSCOPY    Location: [facility] OR            Relevant Problems   Anesthesia (within normal limits)      Cardiac   (+) Hypertension   (+) Paroxysmal atrial fibrillation (Multi)   (-) Chest pain   (-) MI (myocardial infarction) (Multi)   (-) Pacemaker   (-) Stented coronary artery      Pulmonary   (+) COPD (chronic obstructive pulmonary disease) (Multi)   (+) Emphysema lung (Multi)   (+) Shortness of breath on exertion   (-) Asthma   (-) REHANA (obstructive sleep apnea)      GI   (+) GERD (gastroesophageal reflux disease)      /Renal   (-) Acute kidney injury (nontraumatic) (CMS-HCC)   (-) Chronic renal insufficiency   (-) ESRD (end stage renal disease) (Multi)      Endocrine   (-) Diabetes mellitus type 1 (Multi)   (-) Diabetes mellitus, type 2 (Multi)   (-) Hyperthyroidism   (-) Hypothyroidism   (-) Obesity      Hematology   (-) Anticoagulant long-term use   (-) Coagulopathy (Multi)      HEENT   (+) Seasonal allergies      Skin   (+) Basal cell carcinoma (BCC)       Clinical information reviewed:   Tobacco  Allergies  Meds   Med Hx  Surg Hx   Fam Hx  Soc Hx        NPO Detail:  NPO/Void Status  Carbohydrate Drink Given Prior to Surgery? : N  Date of Last Liquid: 04/08/25  Time of Last Liquid: 0500  Date of Last Solid: 04/07/25  Time of Last Solid: 0800  Last Intake Type: Clear fluids  Time of Last Void: 0848         Physical Exam    Airway  Mallampati: II  TM distance: >3 FB     Cardiovascular   Rhythm: regular  Rate: normal     Dental   (+) upper dentures, lower dentures     Pulmonary   (+) rhonchi, decreased breath sounds, wheezes     Abdominal            Anesthesia Plan    History of general anesthesia?: yes  History of complications of general anesthesia?: no    ASA 3     MAC     The patient is a current  smoker.  Patient did not smoke on day of procedure.    intravenous induction   Anesthetic plan and risks discussed with patient and spouse.

## 2025-04-08 NOTE — PROGRESS NOTES
General Surgery  System wide network issue. Images from colonoscopy not pulling through to [facility]. Images should eventually be uploaded through PACS, but will not be in the colonoscopy report.  Kristal Mancilla MD  04/08/25  11:39 AM

## 2025-04-08 NOTE — ANESTHESIA POSTPROCEDURE EVALUATION
Patient: Ray Dover    Procedure Summary       Date: 04/08/25 Room / Location: [facility] OR    Anesthesia Start: 1052 Anesthesia Stop: 1139    Procedure: COLONOSCOPY Diagnosis: History of colon polyps    Scheduled Providers: Kristal Mancilla MD; Ivy Wong RN; Con Salvador RN Responsible Provider: Marino Martinez MD PhD    Anesthesia Type: MAC ASA Status: 3            Anesthesia Type: MAC    Vitals Value Taken Time   /77 04/08/25 1205   Temp 36.7 °C (98.1 °F) 04/08/25 1138   Pulse 98 04/08/25 1205   Resp 24 04/08/25 1205   SpO2 95 % 04/08/25 1205       Anesthesia Post Evaluation    Patient location during evaluation: PACU  Patient participation: complete - patient participated  Level of consciousness: awake and alert  Pain management: satisfactory to patient  Airway patency: patent  Cardiovascular status: hemodynamically stable  Respiratory status: spontaneous ventilation, room air and nonlabored ventilation  Hydration status: euvolemic  Postoperative Nausea and Vomiting: none        No notable events documented.

## 2025-04-08 NOTE — DISCHARGE INSTRUCTIONS
Patient Instructions after a Colonoscopy      The anesthetics, sedatives or narcotics which were given to you today will be acting in your body for the next 24 hours, so you might feel a little sleepy or groggy.  This feeling should slowly wear off. Carefully read and follow the instructions.     You received sedation today:  - Do not drive or operate any machinery or power tools of any kind.   - No alcoholic beverages today, not even beer or wine.  - Do not make any important decisions or sign any legal documents.  - No over the counter medications that contain alcohol or that may cause drowsiness.  - Do not make any important decisions or sign any legal documents.  - Make sure you have someone with you for first 24 hours.    While it is common to experience mild to moderate abdominal distention, gas, or belching after your procedure, if any of these symptoms occur following discharge from the GI Lab or within one week of having your procedure, call the [facility] to be advised whether a visit to your nearest Urgent Care or Emergency Department is indicated.  Take this paper with you if you go.     - If you develop an allergic reaction to the medications that were given during your procedure such as difficulty breathing, rash, hives, severe nausea, vomiting or lightheadedness.  - If you experience chest pain, shortness of breath, severe abdominal pain, fevers and chills.  -If you develop signs and symptoms of bleeding such as blood in your spit, if your stools turn black, tarry, or bloody  - If you have not urinated within 8 hours following your procedure.  - If your IV site becomes painful, red, inflamed, or looks infected.    If you received a biopsy/polypectomy/sphincterotomy the following instructions apply below:    __ Do not use Aspirin containing products, non-steroidal medications or anti-coagulants for one week following your procedure. (Examples of these types of medications are: Advil,  Arthrotec, Aleve, Coumadin, Ecotrin, Heparin, Ibuprofen, Indocin, Motrin, Naprosyn, Nuprin, Plavix, Vioxx, and Voltarin, or their generic forms.  This list is not all-inclusive.  Check with your physician or pharmacist before resuming medications.)   __ Eat a soft diet today.  Avoid foods that are poorly digested for the next 24 hours.  These foods would include: nuts, beans, lettuce, red meats, and fried foods. Start with liquids and advance your diet as tolerated, gradually work up to eating solids.   __ Do not have a Barium Study or Enema for one week.    Your physician recommends the additional following instructions:    -You have a contact number available for emergencies. The signs and symptoms of potential delayed complications were discussed with you. You may return to normal activities tomorrow.  -Resume your previous diet.  -Continue your present medications.   -We are waiting for your pathology results.  -Your physician has recommended a repeat colonoscopy (date to be determined after pending pathology results are reviewed) for surveillance based on pathology results.  -The findings and recommendations have been discussed with you.  -The findings and recommendations were discussed with your family.  - Please see Medication Reconciliation Form for new medication/medications prescribed.       If you experience any problems or have any questions following discharge from the GI Lab, please call:        Nurse Signature                                                                        Date___________________                                                                            Patient/Responsible Party Signature                                        Date___________________

## 2025-04-08 NOTE — H&P
General Surgery H&P     Patient: Ray Dover  : 1955  MRN: [id number]     Chief Complaint: History of colon polyps     History of Present Illness: Ray Dover is a 70 y.o. old male due for surveillance colonoscopy. He had a colonoscopy on 3/5/24 performed in workup of positive a Cologuard test.  That was his first colonoscopy.  He has no family history of colon or rectal cancer or inflammatory bowel disease.  He had 9 subcentimeter polyps removed. These were tubular adenomas, and some were pseudopolyps. However, his prep was not great and he also had colonic spasms.  Therefore, 1 year surveillance colonoscopy was recommended.  He continues to have bowel movements once daily.  He is not on any stool softeners, fiber supplements, or laxatives.  He denies any blood in the stool.  He has no family history of colon or rectal cancer or inflammatory bowel disease.  He is not on any blood thinners or antiplatelet agents.  From a pulmonary standpoint, he is on home oxygen.  He wears 3-4 L at rest, and up to 5 L when ambulating.     Medical History:  COPD, on supplemental oxygen  Sleep apnea  Hypertension  Anxiety     Surgical History:  Colonoscopy, 3/5/24, 9 polyps removed  Tooth extraction  Repair of femur fracture  Cataract surgery  EGD     Home Medications:          Prior to Admission medications    Medication Sig Start Date End Date Taking? Authorizing Provider   albuterol (Proventil HFA) 90 mcg/actuation inhaler Inhale 2 puffs every 6 hours if needed for wheezing. 24   Thee Gilbert PA-C   albuterol 2.5 mg /3 mL (0.083 %) nebulizer solution Take 3 mL (2.5 mg) by nebulization every 6 hours if needed for wheezing or shortness of breath. 25   Thee Gilbert PA-C   albuterol 90 mcg/actuation inhaler Inhale 2 puffs every 4 hours if needed for shortness of breath. 24   Thee Gilbert PA-C   cholecalciferol (Vitamin D-3) 25 MCG (1000 UT) tablet Vitamin D  (Cholecalciferol) 25 MCG (1000 UT) Oral Tablet   Refills: 0        Active       Historical Provider, MD   cyclobenzaprine (Flexeril) 10 mg tablet Take 1 tablet (10 mg) by mouth 2 times a day as needed for muscle spasms. 10/29/24 10/29/25   Thee Gilbert PA-C   dilTIAZem ER (Tiazac) 180 mg 24 hr capsule Take 1 capsule (180 mg) by mouth once daily. 1/27/25 1/27/26   Thee Gilbert PA-C   fexofenadine (Allegra) 180 mg tablet Take 1 tablet (180 mg) by mouth once daily. 4/29/24 4/29/25   Thee Gilbert PA-C   fluticasone (Flonase) 50 mcg/actuation nasal spray Administer 1 spray into each nostril once daily. Shake gently. Before first use, prime pump. After use, clean tip and replace cap. 10/29/24 10/29/25   Thee Gilbert PA-C   furosemide (Lasix) 20 mg tablet Take 1 tablet (20 mg) by mouth once daily. 1/18/24 1/17/25   Thee Gilbert PA-C   multivitamin tablet Multi-Vitamins TABS   Refills: 0        Active       Historical Provider, MD   omeprazole (PriLOSEC) 20 mg DR capsule Take 1 capsule (20 mg) by mouth once daily. 1/18/24 1/17/25   Thee Gilbert PA-C   Trelegy Ellipta 200-62.5-25 mcg blister with device Inhale 1 puff once daily. 10/4/24     Historical Provider, MD   venlafaxine XR (Effexor-XR) 75 mg 24 hr capsule Take 1 capsule (75 mg) by mouth once daily. Do not crush or chew. 1/18/24 1/17/25   Thee Gilbert PA-C   zafirlukast (Accolate) 20 mg tablet Take 1 tablet (20 mg) by mouth 2 times a day. 1/27/25 1/27/26   Thee Gilbert PA-C      Allergies:  Budesonide-formoterol and bee venom protein (honey bee).     Family History:   No family history of colon or rectal cancer or inflammatory bowel disease.     Social History:  Smoker.  Moderate alcohol use. No drug use.     ROS:  Constitutional:  no fever, sweats, and chills  Cardiovascular: + Irregular heartbeat, swelling in ankles  Respiratory: + Wheezing, emphysema, sleep apnea, on supplemental oxygen  Gastrointestinal: + No blood in the stool or change in bowel  habits.  No abdominal pain.  Genitourinary: no dysuria  Musculoskeletal: + Osteoporosis, history of broken bones  Integumentary: no rashes  Neurological: no confusion  Psychiatric: + Anxiety  Endocrine: no heat or cold intolerance  Heme/Lymph: no easy bruising or bleeding     Physical Exam:  Constitutional: No acute distress, conversant, pleasant, accompanied by his wife  Neurologic: alert and oriented  Psych: appropriate affect  Ears, Nose, Mouth and Throat: mucus membranes moist  Pulmonary: No labored breathing, on 5L supplemental oxygen via nasal cannula  Cardiovascular: Regular rate and rhythm  Abdomen: Non-distended, BMI 27  Musculoskeletal: Moves all extremities, no edema  Skin: no jaundice     Labs:  Hgb 12.6 on 10/29/24      Imaging:  No pertinent imaging available for review.     Assessment and Plan: Ray Dover is a 70 y.o. old male with history of colon polyps, due for surveillance colonoscopy.  We discussed the risks of this procedure.  This included risks of bleeding, perforation, missed polyps, incomplete colonoscopy, and potential need for additional procedures pending findings.  The patient was agreeable to proceed.  Bowel prep instructions were reviewed and all questions were answered.  The patient is scheduled for colonoscopy on 4/8/25.  We will perform this at [facility] with assistance of Anesthesiology given his history of sleep apnea and COPD.     Kristal Mancilla MD

## 2025-04-09 ENCOUNTER — TELEPHONE (OUTPATIENT)
Dept: SURGERY | Facility: CLINIC | Age: 70
End: 2025-04-09
Payer: COMMERCIAL

## 2025-04-09 NOTE — TELEPHONE ENCOUNTER
Spoke to patient after Colonoscopy. Pt denies fever, chills, nausea, and vomiting. Pt had no questions at this time.  Provided office number to patient for any questions. Repeat colon in  3  years.  will call in 2 weeks with pathology results.

## 2025-04-14 ENCOUNTER — TELEPHONE (OUTPATIENT)
Dept: SURGERY | Facility: CLINIC | Age: 70
End: 2025-04-14
Payer: COMMERCIAL

## 2025-04-14 LAB
LABORATORY COMMENT REPORT: NORMAL
LABORATORY COMMENT REPORT: NORMAL
PATH REPORT.FINAL DX SPEC: NORMAL
PATH REPORT.FINAL DX SPEC: NORMAL
PATH REPORT.GROSS SPEC: NORMAL
PATH REPORT.GROSS SPEC: NORMAL
PATH REPORT.RELEVANT HX SPEC: NORMAL
PATH REPORT.RELEVANT HX SPEC: NORMAL
PATH REPORT.TOTAL CANCER: NORMAL
PATH REPORT.TOTAL CANCER: NORMAL

## 2025-04-14 NOTE — TELEPHONE ENCOUNTER
I spoke with the patient over the phone to review pathology from his recent colonoscopy.  His polyps were tubular adenomas and hyperplastic.  Two of the tubular adenomas were over a centimeter in size.  Therefore, I recommend repeat colonoscopy in 3 years.    Kristal Mancilla MD  04/14/25  3:07 PM

## 2025-04-29 ENCOUNTER — APPOINTMENT (OUTPATIENT)
Dept: PRIMARY CARE | Facility: CLINIC | Age: 70
End: 2025-04-29
Payer: COMMERCIAL

## 2025-04-29 ENCOUNTER — TELEPHONE (OUTPATIENT)
Dept: PRIMARY CARE | Facility: CLINIC | Age: 70
End: 2025-04-29

## 2025-04-29 NOTE — TELEPHONE ENCOUNTER
I spoke to pt wife she was notified that Ray had an apt today- they can call and reschedule it  she voiced understanding

## 2025-04-29 NOTE — TELEPHONE ENCOUNTER
Message from patients wife that our office phone showed up but no message was left  Please call her  [phone]

## 2025-05-15 ENCOUNTER — APPOINTMENT (OUTPATIENT)
Dept: PRIMARY CARE | Facility: CLINIC | Age: 70
End: 2025-05-15
Payer: COMMERCIAL

## 2025-05-15 DIAGNOSIS — G89.29 CHRONIC LOW BACK PAIN WITHOUT SCIATICA, UNSPECIFIED BACK PAIN LATERALITY: ICD-10-CM

## 2025-05-15 DIAGNOSIS — K21.9 GASTROESOPHAGEAL REFLUX DISEASE WITHOUT ESOPHAGITIS: ICD-10-CM

## 2025-05-15 DIAGNOSIS — J30.2 SEASONAL ALLERGIES: ICD-10-CM

## 2025-05-15 DIAGNOSIS — M54.50 CHRONIC LOW BACK PAIN WITHOUT SCIATICA, UNSPECIFIED BACK PAIN LATERALITY: ICD-10-CM

## 2025-05-15 DIAGNOSIS — I48.0 PAROXYSMAL ATRIAL FIBRILLATION (MULTI): ICD-10-CM

## 2025-05-15 DIAGNOSIS — F41.9 ANXIETY: ICD-10-CM

## 2025-05-15 DIAGNOSIS — R60.0 BILATERAL LEG EDEMA: ICD-10-CM

## 2025-05-15 PROCEDURE — 99213 OFFICE O/P EST LOW 20 MIN: CPT

## 2025-05-15 PROCEDURE — 1159F MED LIST DOCD IN RCRD: CPT

## 2025-05-15 RX ORDER — FLUTICASONE PROPIONATE 50 MCG
1 SPRAY, SUSPENSION (ML) NASAL DAILY
Qty: 16 G | Refills: 5 | Status: SHIPPED | OUTPATIENT
Start: 2025-05-15 | End: 2026-05-15

## 2025-05-15 RX ORDER — OMEPRAZOLE 20 MG/1
20 CAPSULE, DELAYED RELEASE ORAL DAILY
Qty: 90 CAPSULE | Refills: 3 | Status: SHIPPED | OUTPATIENT
Start: 2025-05-15 | End: 2026-05-15

## 2025-05-15 RX ORDER — VENLAFAXINE HYDROCHLORIDE 75 MG/1
75 CAPSULE, EXTENDED RELEASE ORAL DAILY
Qty: 90 CAPSULE | Refills: 3 | Status: SHIPPED | OUTPATIENT
Start: 2025-05-15 | End: 2026-05-15

## 2025-05-15 RX ORDER — PREDNISONE 2.5 MG/1
5 TABLET ORAL DAILY
COMMUNITY
Start: 2025-04-11

## 2025-05-15 RX ORDER — CYCLOBENZAPRINE HCL 10 MG
10 TABLET ORAL 2 TIMES DAILY PRN
Qty: 60 TABLET | Refills: 2 | Status: SHIPPED | OUTPATIENT
Start: 2025-05-15 | End: 2026-05-15

## 2025-05-15 RX ORDER — FUROSEMIDE 20 MG/1
20 TABLET ORAL DAILY
Qty: 90 TABLET | Refills: 3 | Status: SHIPPED | OUTPATIENT
Start: 2025-05-15 | End: 2026-05-15

## 2025-05-15 ASSESSMENT — ENCOUNTER SYMPTOMS
CONSTITUTIONAL NEGATIVE: 1
CARDIOVASCULAR NEGATIVE: 1
RESPIRATORY NEGATIVE: 1
GASTROINTESTINAL NEGATIVE: 1

## 2025-05-15 NOTE — PROGRESS NOTES
Subjective   Patient ID: Ray Dover is a 70 y.o. male who presents for med check .    Virtual or Telephone Consent    An interactive audio and video telecommunication system which permits real time communications between the patient (at the originating site) and provider (at the distant site) was utilized to provide this telehealth service.   Verbal consent was requested and obtained from Ray Dover on this date, 05/15/25 for a telehealth visit and the patient's location was confirmed at the time of the visit.     HPI   COPD/HYPOXEMIA: Pulse ox usually low to mid 90s at home. Using 4 liters of O2 continuous. Switched him to Trellegy recently, he seemed to be doing better on lower dose than the newly increased dose. Pulse Ox today is 89%. Following with pulmonology at [facility] in [place].  OSTEOPOROSIS/MULTIPLE FRACTURE THORACIC SPINE: Last DEXA 6/30/17. Would not like another DEXA, takes vit D and calcium daily.   LOW BACK PAIN: He endorses low back chronically, Xray 5/27/22 showed age-indeterminate compression deformities in mid-thoracic spine. Hard to sleep with low back pain, endorses 4-5/10 pain. Cyclobenzaprine effective. No longer needing meloxicam.  AFIB: Following with [facility] cardiology. Vitals stable.  GERD: Omeprazole effective, stable.   BL LEG EDEMA: S/P L femur repair per Dr. Foss 5/27/22. Still with some residual BL LE edema. Does not wear compression stockings, Lasix very prn now.  ANXIETY: Compliant with Effexor 75mg daily, no SE. SIGRID 8/PHQ 7.  ALLERGIES: Allegra prn, has been using Afrin daily for some time. Still with daily congestion.     Colonoscopy 2025, due 2028.    Review of Systems   Constitutional: Negative.    Respiratory: Negative.     Cardiovascular: Negative.    Gastrointestinal: Negative.        Objective   There were no vitals taken for this visit.    Physical Exam  Unable to perform d/t nature of visit.    Assessment/Plan        Chronic conditions stable.  No  medication changes today.  Will follow up in 6 month in person and get labs then.    Total time spent caring for the patient today was 20 minutes. This includes time spent before the visit reviewing the chart, time spent during the visit, and time spent after the visit on documentation, etc

## 2025-07-23 ENCOUNTER — TELEPHONE (OUTPATIENT)
Dept: PRIMARY CARE | Facility: CLINIC | Age: 70
End: 2025-07-23
Payer: COMMERCIAL

## 2025-07-23 DIAGNOSIS — J44.9 CHRONIC OBSTRUCTIVE PULMONARY DISEASE, UNSPECIFIED COPD TYPE (MULTI): ICD-10-CM

## 2025-07-23 RX ORDER — ALBUTEROL SULFATE 0.83 MG/ML
SOLUTION RESPIRATORY (INHALATION)
Qty: 540 ML | Refills: 7 | OUTPATIENT
Start: 2025-07-23

## 2025-07-24 DIAGNOSIS — J44.9 CHRONIC OBSTRUCTIVE PULMONARY DISEASE, UNSPECIFIED COPD TYPE (MULTI): ICD-10-CM

## 2025-07-24 RX ORDER — ALBUTEROL SULFATE 0.83 MG/ML
2.5 SOLUTION RESPIRATORY (INHALATION) EVERY 6 HOURS PRN
Qty: 450 ML | Refills: 3 | Status: SHIPPED | OUTPATIENT
Start: 2025-07-24 | End: 2026-07-24

## 2025-07-25 ENCOUNTER — TELEPHONE (OUTPATIENT)
Dept: PRIMARY CARE | Facility: CLINIC | Age: 70
End: 2025-07-25
Payer: COMMERCIAL

## 2025-07-25 NOTE — TELEPHONE ENCOUNTER
Message from wife that his Rx for Albuterol was denied.  Can you let her know what's going on with that.

## 2025-07-25 NOTE — TELEPHONE ENCOUNTER
Spoke with the pt - Let him know the script was sent to [facility] yesterday - The script request from the pharmacy on 7/23/25 was the one denied, but the request from 7/24/25 from his wife was sent through. Voiced understanding.

## 2025-11-17 ENCOUNTER — APPOINTMENT (OUTPATIENT)
Dept: PRIMARY CARE | Facility: CLINIC | Age: 70
End: 2025-11-17
Payer: COMMERCIAL